# Patient Record
Sex: FEMALE | Race: BLACK OR AFRICAN AMERICAN | NOT HISPANIC OR LATINO | Employment: OTHER | URBAN - METROPOLITAN AREA
[De-identification: names, ages, dates, MRNs, and addresses within clinical notes are randomized per-mention and may not be internally consistent; named-entity substitution may affect disease eponyms.]

---

## 2022-05-25 ENCOUNTER — EVALUATION (OUTPATIENT)
Dept: PHYSICAL THERAPY | Facility: CLINIC | Age: 72
End: 2022-05-25
Payer: MEDICARE

## 2022-05-25 DIAGNOSIS — M25.611 SHOULDER JOINT STIFFNESS, RIGHT: Primary | ICD-10-CM

## 2022-05-25 DIAGNOSIS — M25.612 SHOULDER JOINT STIFFNESS, LEFT: ICD-10-CM

## 2022-05-25 PROCEDURE — 97110 THERAPEUTIC EXERCISES: CPT

## 2022-05-25 PROCEDURE — 97161 PT EVAL LOW COMPLEX 20 MIN: CPT

## 2022-05-25 NOTE — LETTER
May 25, 2022    Lisette Andujar, [address] [facility] [place]    Patient: Yumiko Weinberg   YOB: 1950   Date of Visit: 2022     Encounter Diagnosis     ICD-10-CM    1  Shoulder joint stiffness, right  M25 611    2  Shoulder joint stiffness, left  M25 612        Dear Dr Duglas Perez: Thank you for your recent referral of Yumiko Weinberg  Please review the attached evaluation summary from Xochitl's recent visit  Please verify that you agree with the plan of care by signing the attached order  If you have any questions or concerns, please do not hesitate to call  I sincerely appreciate the opportunity to share in the care of one of your patients and hope to have another opportunity to work with you in the near future  Sincerely,    Boom Aviles, PT      Referring Provider:      I certify that I have read the below Plan of Care and certify the need for these services furnished under this plan of treatment while under my care  Lisette Andujar MD  [address]  [facility]          PT Evaluation     Today's date: 2022  Patient name: Yumiko Weinberg  : 1950  MRN: [id number]  Referring provider: Katherine Smith MD  Dx:   Encounter Diagnosis     ICD-10-CM    1  Shoulder joint stiffness, right  M25 611    2  Shoulder joint stiffness, left  M25 612                   Assessment  Assessment details: Yumiko Weinberg is a 67 y o  female presenting to physical therapy for an initial evaluation with a MD referral of bilateral shoulder pain and stiffness The patient demonstrates increased pain, decreased shoulder ROM, decreased shoulder strength, and limited ability to complete overhead mobility, dressing and lifting pots/pans  These deficits have limited the patient's ability to complete ADLs, avocational responsibilities, and recreational activities   This patient would benefit from OP PT services to address their impairments and functional limitations to maximize functional mobility  The patient was provided a HEP and demonstrated/verbalized understanding  Impairments: abnormal or restricted ROM, activity intolerance, impaired physical strength, lacks appropriate home exercise program and pain with function    Symptom irritability: moderateUnderstanding of Dx/Px/POC: excellent   Prognosis: good    Goals  STG to be achieved in 4 weeks: The patient will report a decrease in bilateral shoulder "at worst" subjective pain rating score to at least 5/10 to allow for improved tolerance for functional activities  The patient will increase left shoulder flexion AROM to at least 115 degrees to allow for improved functional mobility  The patient will increase left shoulder flexion MMT score to at least 4/5 to allow for improved functional mobility  LTG to be achieved by DC: The patient will be independent in comprehensive HEP  The patient will improve left shoulder AROM to that of the right to allow for improved functional mobility  The patient will increase bilateral shoulder ER MMT score to [facility] to allow for improved functional mobility  The patient will be able to lift a moderately heavy grocery bag without difficulty  The patient will be able to lift her pots/pans onto her stove without difficulty  The patient will be able to put on her shirts and jackets without difficulty         Plan  Patient would benefit from: skilled physical therapy  Planned modality interventions: thermotherapy: hydrocollator packs, TENS and cryotherapy  Planned therapy interventions: manual therapy, joint mobilization, neuromuscular re-education, patient education, flexibility, strengthening, stretching, therapeutic activities, therapeutic exercise and home exercise program  Frequency: 2x week  Duration in weeks: 8  Plan of Care beginning date: 5/25/2022  Plan of Care expiration date: 7/20/2022  Treatment plan discussed with: patient        Subjective Evaluation    History of Present Illness  Mechanism of injury: George Elizabeth presents to therapy with a chief complaint of bilateral shoulder pain and stiffness ongoing for the past several months  Denies any incident that may have aggravated her shoulders, notes that just gradually over time the shoulders became more stiff and painful  Reports increased difficulty raising her arm to reach objects on high shelves in her kitchen and bathroom cabinets as well as in the closet of her bedroom  Also notes difficulty lifting grocery bags that may be heavy as well as lifting a pot from one part of the stove to the other or lifting a pot or pan from a cabinet onto the stove  Reports difficulty putting her shirt and jacket on/off as well as reaching behind her back for her bra strap  States that if she does force herself to do these activities, the pain comes afterwards  Denies history of neck pain or difficulties turning her neck  Notes tingling in both of her hands in the fingertips and states that this was ongoing prior to the start of her shoulder pain  Denies worsening of tingling with activities and feels that this is likely related to diabetes     Pain  Current pain ratin  At best pain ratin  At worst pain ratin  Location: bilateral shoulder pain L>R  Quality: dull ache  Relieving factors: medications and relaxation  Aggravating factors: lifting and overhead activity    Social Support  Lives with: spouse    Employment status: not working  Hand dominance: right          Objective     Active Range of Motion   Left Shoulder   Flexion: 100 degrees with pain  Adduction: 100 degrees with pain  External rotation BTH: C5 with pain  Internal rotation BTB: sacrum with pain    Right Shoulder   Flexion: 125 degrees with pain  Abduction: 120 degrees with pain  External rotation BTH: C7 with pain  Internal rotation BTB: L1 with pain    Passive Range of Motion   Left Shoulder   Flexion: 107 degrees with pain  Abduction: 104 degrees with pain  External rotation 45°: 48 degrees   Internal rotation 45°: 46 degrees with pain    Right Shoulder   Flexion: 127 degrees with pain  Abduction: 125 degrees with pain  External rotation 45°: 58 degrees with pain  Internal rotation 45°: 55 degrees with pain    Strength/Myotome Testing     Left Shoulder     Planes of Motion   Flexion: 4-   Abduction: 4-   External rotation at 0°: 3+   Internal rotation at 0°: 4-     Right Shoulder     Planes of Motion   Flexion: 4   Abduction: 4   External rotation at 0°: 4-   Internal rotation at 0°: 4              Precautions: DM, HTN, Hypothyroidism   Access Code: [email or web address]       Manuals 5/25                                                                Neuro Re-Ed             TB rows             TB pulldowns             Bent over rows             Bent over prone Y,T,Is                                                    Ther Ex             UBE fwd/retro             Pulleys             Finger Ladder             Wall slides flex, scapt HEP            TB ER             TB IR             Standing shoulder 3 ways              Standing shoulder IR stretch with strap             Pt education PT POC, HEP            Ther Activity                                       Gait Training                                       Modalities

## 2022-05-25 NOTE — PROGRESS NOTES
PT Evaluation     Today's date: 2022  Patient name: Waleska Contreras  : 1950  MRN: [id number]  Referring provider: Tim Gottron, MD  Dx:   Encounter Diagnosis     ICD-10-CM    1  Shoulder joint stiffness, right  M25 611    2  Shoulder joint stiffness, left  M25 612                   Assessment  Assessment details: Waleska Contreras is a 67 y o  female presenting to physical therapy for an initial evaluation with a MD referral of bilateral shoulder pain and stiffness The patient demonstrates increased pain, decreased shoulder ROM, decreased shoulder strength, and limited ability to complete overhead mobility, dressing and lifting pots/pans  These deficits have limited the patient's ability to complete ADLs, avocational responsibilities, and recreational activities  This patient would benefit from OP PT services to address their impairments and functional limitations to maximize functional mobility  The patient was provided a HEP and demonstrated/verbalized understanding  Impairments: abnormal or restricted ROM, activity intolerance, impaired physical strength, lacks appropriate home exercise program and pain with function    Symptom irritability: moderateUnderstanding of Dx/Px/POC: excellent   Prognosis: good    Goals  STG to be achieved in 4 weeks: The patient will report a decrease in bilateral shoulder "at worst" subjective pain rating score to at least 5/10 to allow for improved tolerance for functional activities  The patient will increase left shoulder flexion AROM to at least 115 degrees to allow for improved functional mobility  The patient will increase left shoulder flexion MMT score to at least 4/5 to allow for improved functional mobility  LTG to be achieved by DC: The patient will be independent in comprehensive HEP  The patient will improve left shoulder AROM to that of the right to allow for improved functional mobility     The patient will increase bilateral shoulder ER MMT score to [facility] to allow for improved functional mobility  The patient will be able to lift a moderately heavy grocery bag without difficulty  The patient will be able to lift her pots/pans onto her stove without difficulty  The patient will be able to put on her shirts and jackets without difficulty  Plan  Patient would benefit from: skilled physical therapy  Planned modality interventions: thermotherapy: hydrocollator packs, TENS and cryotherapy  Planned therapy interventions: manual therapy, joint mobilization, neuromuscular re-education, patient education, flexibility, strengthening, stretching, therapeutic activities, therapeutic exercise and home exercise program  Frequency: 2x week  Duration in weeks: 8  Plan of Care beginning date: 5/25/2022  Plan of Care expiration date: 7/20/2022  Treatment plan discussed with: patient        Subjective Evaluation    History of Present Illness  Mechanism of injury: Mendez Menchaca presents to therapy with a chief complaint of bilateral shoulder pain and stiffness ongoing for the past several months  Denies any incident that may have aggravated her shoulders, notes that just gradually over time the shoulders became more stiff and painful  Reports increased difficulty raising her arm to reach objects on high shelves in her kitchen and bathroom cabinets as well as in the closet of her bedroom  Also notes difficulty lifting grocery bags that may be heavy as well as lifting a pot from one part of the stove to the other or lifting a pot or pan from a cabinet onto the stove  Reports difficulty putting her shirt and jacket on/off as well as reaching behind her back for her bra strap  States that if she does force herself to do these activities, the pain comes afterwards  Denies history of neck pain or difficulties turning her neck  Notes tingling in both of her hands in the fingertips and states that this was ongoing prior to the start of her shoulder pain   Denies worsening of tingling with activities and feels that this is likely related to diabetes     Pain  Current pain ratin  At best pain ratin  At worst pain ratin  Location: bilateral shoulder pain L>R  Quality: dull ache  Relieving factors: medications and relaxation  Aggravating factors: lifting and overhead activity    Social Support  Lives with: spouse    Employment status: not working  Hand dominance: right          Objective     Active Range of Motion   Left Shoulder   Flexion: 100 degrees with pain  Adduction: 100 degrees with pain  External rotation BTH: C5 with pain  Internal rotation BTB: sacrum with pain    Right Shoulder   Flexion: 125 degrees with pain  Abduction: 120 degrees with pain  External rotation BTH: C7 with pain  Internal rotation BTB: L1 with pain    Passive Range of Motion   Left Shoulder   Flexion: 107 degrees with pain  Abduction: 104 degrees with pain  External rotation 45°: 48 degrees   Internal rotation 45°: 46 degrees with pain    Right Shoulder   Flexion: 127 degrees with pain  Abduction: 125 degrees with pain  External rotation 45°: 58 degrees with pain  Internal rotation 45°: 55 degrees with pain    Strength/Myotome Testing     Left Shoulder     Planes of Motion   Flexion: 4-   Abduction: 4-   External rotation at 0°: 3+   Internal rotation at 0°: 4-     Right Shoulder     Planes of Motion   Flexion: 4   Abduction: 4   External rotation at 0°: 4-   Internal rotation at 0°: 4              Precautions: DM, HTN, Hypothyroidism   Access Code: [email or web address]       Manuals                                                                 Neuro Re-Ed             TB rows             TB pulldowns             Bent over rows             Bent over prone Y,T,Is                                                    Ther Ex             UBE fwd/retro             Pulleys             Finger Ladder             Wall slides flex, scapt HEP            TB ER             TB IR             Standing shoulder 3 ways              Standing shoulder IR stretch with strap             Pt education PT POC, HEP            Ther Activity                                       Gait Training                                       Modalities

## 2022-06-01 ENCOUNTER — OFFICE VISIT (OUTPATIENT)
Dept: PHYSICAL THERAPY | Facility: CLINIC | Age: 72
End: 2022-06-01
Payer: MEDICARE

## 2022-06-01 DIAGNOSIS — M25.612 SHOULDER JOINT STIFFNESS, LEFT: Primary | ICD-10-CM

## 2022-06-01 DIAGNOSIS — M25.611 SHOULDER JOINT STIFFNESS, RIGHT: ICD-10-CM

## 2022-06-01 PROCEDURE — 97110 THERAPEUTIC EXERCISES: CPT

## 2022-06-03 ENCOUNTER — OFFICE VISIT (OUTPATIENT)
Dept: PHYSICAL THERAPY | Facility: CLINIC | Age: 72
End: 2022-06-03
Payer: MEDICARE

## 2022-06-03 DIAGNOSIS — M25.611 SHOULDER JOINT STIFFNESS, RIGHT: ICD-10-CM

## 2022-06-03 DIAGNOSIS — M25.612 SHOULDER JOINT STIFFNESS, LEFT: Primary | ICD-10-CM

## 2022-06-03 PROCEDURE — 97110 THERAPEUTIC EXERCISES: CPT

## 2022-06-03 PROCEDURE — 97112 NEUROMUSCULAR REEDUCATION: CPT

## 2022-06-03 NOTE — PROGRESS NOTES
Daily Note     Today's date: 6/3/2022  Patient name: Se Norton  : 1950  MRN: [id number]  Referring provider: Anna Lew MD  Dx:   Encounter Diagnosis     ICD-10-CM    1  Shoulder joint stiffness, left  M25 612    2  Shoulder joint stiffness, right  M25 611                   Subjective: Patient reports that she felt stiff and achy after last session  Notes that they feel "pretty good" today  Objective: See treatment diary below      Assessment: Tolerated treatment well  Added finger ladder this session to facilitate improved shoulder flexion ROM bilaterally  Also progressed scapular stabilization exercises with addition of bent over rows  Patient demonstrated fatigue post treatment, exhibited good technique with therapeutic exercises and would benefit from continued PT      Plan: Continue per plan of care        Precautions: DM, HTN, Hypothyroidism   Access Code: [facility]       Manuals 5/25 6/1 6/3                                                              Neuro Re-Ed             TB rows  RTB 2x10  RTB 2x10           TB pulldowns  RTB 2x10  RTB 2x10          Bent over rows   3# 2x10 B/L          Bent over prone Y,T,Is                                                    Ther Ex             UBE fwd/retro             Pulleys  3' flex, scapt 3' flex, scapt          Finger Ladder   x10 B/L          Wall slides flex, scapt HEP 5"x10 B/L           TB ER  RTB 2x10 B/L RTB 2x10 B/L          TB IR  RTB 2x10   B/L RTB 2x10 B/L          Standing shoulder 3 ways              Standing shoulder IR stretch with strap             Pt education PT POC, HEP            Ther Activity                                       Gait Training                                       Modalities

## 2022-06-08 ENCOUNTER — OFFICE VISIT (OUTPATIENT)
Dept: PHYSICAL THERAPY | Facility: CLINIC | Age: 72
End: 2022-06-08
Payer: MEDICARE

## 2022-06-08 DIAGNOSIS — M25.612 SHOULDER JOINT STIFFNESS, LEFT: Primary | ICD-10-CM

## 2022-06-08 DIAGNOSIS — M25.611 SHOULDER JOINT STIFFNESS, RIGHT: ICD-10-CM

## 2022-06-08 PROCEDURE — 97112 NEUROMUSCULAR REEDUCATION: CPT

## 2022-06-08 PROCEDURE — 97110 THERAPEUTIC EXERCISES: CPT

## 2022-06-08 NOTE — PROGRESS NOTES
Daily Note     Today's date: 2022  Patient name: Sasha Calderón  : 1950  MRN: [id number]  Referring provider: Horace aRymond MD  Dx:   Encounter Diagnosis     ICD-10-CM    1  Shoulder joint stiffness, left  M25 612    2  Shoulder joint stiffness, right  M25 611                   Subjective: Patient reports that her shoulder felt very sore last night and thinks it may be due to over using it with lifting groceries  Objective: See treatment diary below      Assessment: Tolerated treatment well  Progressed patient with increased reps as outlined below  Unable to increase reps for shoulder ER secondary to patient noting significant challenge with current reps  Added shoulder IR stretch with strap to facilitate improved shoulder IR ROM  Patient demonstrated fatigue post treatment, exhibited good technique with therapeutic exercises and would benefit from continued PT      Plan: Progress treatment as tolerated         Precautions: DM, HTN, Hypothyroidism   Access Code: [facility]       Manuals 5/25 6/1 6/3 6/8                                                             Neuro Re-Ed             TB rows  RTB 2x10  RTB 2x10  RTB 3x10         TB pulldowns  RTB 2x10  RTB 2x10 RTB 3x10          Bent over rows   3# 2x10 B/L 3# 2x10 B/L         Bent over prone Y,T,Is                                                    Ther Ex             UBE fwd/retro             Pulleys  3' flex, scapt 3' flex, scapt 3' flex, scapt          Finger Ladder   x10 B/L          Wall slides flex, scapt HEP 5"x10 B/L           TB ER  RTB 2x10 B/L RTB 2x10 B/L RTB 2x10 B/L         TB IR  RTB 2x10   B/L RTB 2x10 B/L RTB   3x10 B/L         Standing shoulder 3 ways              Standing shoulder IR stretch with strap    10"x10 B/L         Pt education PT POC, HEP            Ther Activity                                       Gait Training                                       Modalities Patient 1 on 1 on 6/8/22 for 23 minutes

## 2022-06-10 ENCOUNTER — OFFICE VISIT (OUTPATIENT)
Dept: PHYSICAL THERAPY | Facility: CLINIC | Age: 72
End: 2022-06-10
Payer: MEDICARE

## 2022-06-10 DIAGNOSIS — M25.611 SHOULDER JOINT STIFFNESS, RIGHT: ICD-10-CM

## 2022-06-10 DIAGNOSIS — M25.612 SHOULDER JOINT STIFFNESS, LEFT: Primary | ICD-10-CM

## 2022-06-10 PROCEDURE — 97112 NEUROMUSCULAR REEDUCATION: CPT

## 2022-06-10 PROCEDURE — 97110 THERAPEUTIC EXERCISES: CPT

## 2022-06-10 NOTE — PROGRESS NOTES
Daily Note     Today's date: 6/10/2022  Patient name: Megan Fairchild  : 1950  MRN: [id number]  Referring provider: David Elias MD  Dx:   Encounter Diagnosis     ICD-10-CM    1  Shoulder joint stiffness, left  M25 612    2  Shoulder joint stiffness, right  M25 611                   Subjective: States that she feels achy and stiff today  Notes that the day after therapy they seem good and then they tighten back up  Objective: See treatment diary below      Assessment: Tolerated treatment well  Added standing shoulder 3 ways with increase fatigue following  Patient reported joint crepitus during this exercise, however was able to complete assigned reps  Patient demonstrated fatigue post treatment, exhibited good technique with therapeutic exercises and would benefit from continued PT      Plan: Continue per plan of care        Precautions: DM, HTN, Hypothyroidism   Access Code: [facility]       Manuals 5/25 6/1 6/3 6/8 6/10                                                            Neuro Re-Ed             TB rows  RTB 2x10  RTB 2x10  RTB 3x10 RTB 3x10        TB pulldowns  RTB 2x10  RTB 2x10 RTB 3x10  RTB 3x10         Bent over rows   3# 2x10 B/L 3# 2x10 B/L 3# 2x10 B/L         Bent over prone Y,T,Is                                                    Ther Ex             UBE fwd/retro             Pulleys  3' flex, scapt 3' flex, scapt 3' flex, scapt  3' flex, scapt         Finger Ladder   x10 B/L  x10 B/L        Wall slides flex, scapt HEP 5"x10 B/L   5"x10 B/L         TB ER  RTB 2x10 B/L RTB 2x10 B/L RTB 2x10 B/L         TB IR  RTB 2x10   B/L RTB 2x10 B/L RTB   3x10 B/L         Standing shoulder 3 ways      1# x10 ea dir         Standing shoulder IR stretch with strap    10"x10 B/L         Pt education PT POC, HEP            Ther Activity                                       Gait Training                                       Modalities

## 2022-06-15 ENCOUNTER — OFFICE VISIT (OUTPATIENT)
Dept: PHYSICAL THERAPY | Facility: CLINIC | Age: 72
End: 2022-06-15
Payer: MEDICARE

## 2022-06-15 DIAGNOSIS — M25.611 SHOULDER JOINT STIFFNESS, RIGHT: ICD-10-CM

## 2022-06-15 DIAGNOSIS — M25.612 SHOULDER JOINT STIFFNESS, LEFT: Primary | ICD-10-CM

## 2022-06-15 PROCEDURE — 97110 THERAPEUTIC EXERCISES: CPT

## 2022-06-15 PROCEDURE — 97112 NEUROMUSCULAR REEDUCATION: CPT

## 2022-06-15 NOTE — PROGRESS NOTES
Daily Note     Today's date: 6/15/2022   Patient name: Aysha Concepcion  : 1950  MRN: [id number]  Referring provider: Ashley Blanchard MD  Dx:   Encounter Diagnosis     ICD-10-CM    1  Shoulder joint stiffness, left  M25 612    2  Shoulder joint stiffness, right  M25 611                   Subjective: Patient reports that her shoulders are feeling pretty good today  Notes that she has been completing her exercises at home  Objective: See treatment diary below      Assessment: Tolerated treatment well  UT compensations noted with TB pulldowns; required VCs to correct form  Added B/L shoulder ER this session to facilitate improve scapular stabilization  Patient demonstrated fatigue post treatment, exhibited good technique with therapeutic exercises and would benefit from continued PT      Plan: Continue per plan of care        Precautions: DM, HTN, Hypothyroidism   Access Code: [facility]       Manuals 5/25 6/1 6/3 6/8 6/10 6/15                                                           Neuro Re-Ed             TB rows  RTB 2x10  RTB 2x10  RTB 3x10 RTB 3x10 RTB 3x10        TB pulldowns  RTB 2x10  RTB 2x10 RTB 3x10  RTB 3x10  RTB 3x10        Bent over rows   3# 2x10 B/L 3# 2x10 B/L 3# 2x10 B/L  3# 2x10 B/L       Bent over prone Y,T,Is             B/L Shoulder ER      RTB x10                                  Ther Ex             UBE fwd/retro             Pulleys  3' flex, scapt 3' flex, scapt 3' flex, scapt  3' flex, scapt  3' flex, scapt       Finger Ladder   x10 B/L  x10 B/L        Wall slides flex, scapt HEP 5"x10 B/L   5"x10 B/L         TB ER  RTB 2x10 B/L RTB 2x10 B/L RTB 2x10 B/L  RTB 2x10 B/L       TB IR  RTB 2x10   B/L RTB 2x10 B/L RTB   3x10 B/L  RTB 3x10 B/L       Standing shoulder 3 ways      1# x10 ea dir  1# x10 ea dir        Standing shoulder IR stretch with strap    10"x10 B/L         Pt education PT POC, HEP            Ther Activity                                       Gait Training Modalities                                           Patient 1 on 1 on 6/15/22 for 38 minutes

## 2022-06-17 ENCOUNTER — OFFICE VISIT (OUTPATIENT)
Dept: PHYSICAL THERAPY | Facility: CLINIC | Age: 72
End: 2022-06-17
Payer: MEDICARE

## 2022-06-17 DIAGNOSIS — M25.612 SHOULDER JOINT STIFFNESS, LEFT: Primary | ICD-10-CM

## 2022-06-17 DIAGNOSIS — M25.611 SHOULDER JOINT STIFFNESS, RIGHT: ICD-10-CM

## 2022-06-17 PROCEDURE — 97112 NEUROMUSCULAR REEDUCATION: CPT

## 2022-06-17 PROCEDURE — 97110 THERAPEUTIC EXERCISES: CPT

## 2022-06-17 NOTE — PROGRESS NOTES
Daily Note     Today's date: 2022  Patient name: Irma Sebastian  : 1950  MRN: [id number]  Referring provider: Marcell Contreras MD  Dx:   Encounter Diagnosis     ICD-10-CM    1  Shoulder joint stiffness, left  M25 612    2  Shoulder joint stiffness, right  M25 611                   Subjective: Pt noted no pain but just stiffness  Objective: See treatment diary below      Assessment: Continued with treatment session, overall patient able to complete all exercises with no increase in p!   Progressed to GTB with pull downs and rows  Pt noted that her R shoulder is worse than the L  Tolerated treatment fair  Patient exhibited good technique with therapeutic exercises and would benefit from continued PT  Pt noted having some soreness s/p treatment  Pt noted compliant with her HEP on a daily basis  Plan: Continue per plan of care  Precautions: DM, HTN, Hypothyroidism   Access Code: [facility]       Manuals 5/25 6/1 6/3 6/8 6/10 6/15 6/17                                                          Neuro Re-Ed             TB rows  RTB 2x10  RTB 2x10  RTB 3x10 RTB 3x10 RTB 3x10  GTB 3x 10       TB pulldowns  RTB 2x10  RTB 2x10 RTB 3x10  RTB 3x10  RTB 3x10  GTB 3x 10       Bent over rows   3# 2x10 B/L 3# 2x10 B/L 3# 2x10 B/L  3# 2x10 B/L 3# 2x 10       Bent over prone Y,T,Is             B/L Shoulder ER      RTB x10                                  Ther Ex             UBE fwd/retro             Pulleys  3' flex, scapt 3' flex, scapt 3' flex, scapt  3' flex, scapt  3' flex, scapt 3 min flexion and scaption       Finger Ladder   x10 B/L  x10 B/L        Wall slides flex, scapt HEP 5"x10 B/L   5"x10 B/L         TB ER  RTB 2x10 B/L RTB 2x10 B/L RTB 2x10 B/L  RTB 2x10 B/L RTB 2x 10       TB IR  RTB 2x10   B/L RTB 2x10 B/L RTB   3x10 B/L  RTB 3x10 B/L RTB 2x 10 ea   B/l       Standing shoulder 3 ways      1# x10 ea dir  1# x10 ea dir  1# 10x ea direction       Standing shoulder IR stretch with strap 10"x10 B/L         Pt education PT POC, HEP            Ther Activity                                       Gait Training                                       Modalities

## 2022-06-22 ENCOUNTER — OFFICE VISIT (OUTPATIENT)
Dept: PHYSICAL THERAPY | Facility: CLINIC | Age: 72
End: 2022-06-22
Payer: MEDICARE

## 2022-06-22 DIAGNOSIS — M25.612 SHOULDER JOINT STIFFNESS, LEFT: Primary | ICD-10-CM

## 2022-06-22 DIAGNOSIS — M25.611 SHOULDER JOINT STIFFNESS, RIGHT: ICD-10-CM

## 2022-06-22 PROCEDURE — 97110 THERAPEUTIC EXERCISES: CPT

## 2022-06-22 PROCEDURE — 97112 NEUROMUSCULAR REEDUCATION: CPT

## 2022-06-22 NOTE — PROGRESS NOTES
Daily Note     Today's date: 2022  Patient name: Hyun Tillman  : 1950  MRN: [id number]  Referring provider: Soren Knapp MD  Dx:   Encounter Diagnosis     ICD-10-CM    1  Shoulder joint stiffness, left  M25 612    2  Shoulder joint stiffness, right  M25 611                   Subjective: Pt noted that she feels like she is making improvements  Pt noted that she feels stiffer in the morning than she does in the evening  Objective: See treatment diary below      Assessment: Continued with treatment, Tolerated treatment fairly well   Pt noted challenge with Shoulder AROM to 90 degrees in flexion and scaption  Patient exhibited good technique with therapeutic exercises and would benefit from continued PT  Provided patient a GTB for at home continuation to progress  Plan: Continue per plan of care  RE - NV, please provide patient with updated HEP at this time  Precautions: DM, HTN, Hypothyroidism   Access Code: [facility]       Manuals  6/3 6/8 6/10 6/15 6/17 6/22                                                         Neuro Re-Ed             TB rows  RTB 2x10  RTB 2x10  RTB 3x10 RTB 3x10 RTB 3x10  GTB 3x 10  GTb 3x 10      TB pulldowns  RTB 2x10  RTB 2x10 RTB 3x10  RTB 3x10  RTB 3x10  GTB 3x 10  GTB 3x 10      Bent over rows   3# 2x10 B/L 3# 2x10 B/L 3# 2x10 B/L  3# 2x10 B/L 3# 2x 10       Bent over prone Y,T,Is             B/L Shoulder ER      RTB x10                                  Ther Ex             UBE fwd/retro             Pulleys  3' flex, scapt 3' flex, scapt 3' flex, scapt  3' flex, scapt  3' flex, scapt 3 min flexion and scaption  3 min flexion and scaption      Finger Ladder   x10 B/L  x10 B/L        Wall slides flex, scapt HEP 5"x10 B/L   5"x10 B/L         TB ER  RTB 2x10 B/L RTB 2x10 B/L RTB 2x10 B/L  RTB 2x10 B/L RTB 2x 10  RTB 2x 10      TB IR  RTB 2x10   B/L RTB 2x10 B/L RTB   3x10 B/L  RTB 3x10 B/L RTB 2x 10 ea  B/l  RTB 2x 10 ea        Standing shoulder 3 ways      1# x10 ea dir  1# x10 ea dir  1# 10x ea direction       Standing shoulder IR stretch with strap    10"x10 B/L         Pt education PT POC, HEP            Ther Activity                                       Gait Training                                       Modalities

## 2022-06-24 ENCOUNTER — EVALUATION (OUTPATIENT)
Dept: PHYSICAL THERAPY | Facility: CLINIC | Age: 72
End: 2022-06-24
Payer: MEDICARE

## 2022-06-24 DIAGNOSIS — M25.612 SHOULDER JOINT STIFFNESS, LEFT: Primary | ICD-10-CM

## 2022-06-24 DIAGNOSIS — M25.611 SHOULDER JOINT STIFFNESS, RIGHT: ICD-10-CM

## 2022-06-24 PROCEDURE — 97110 THERAPEUTIC EXERCISES: CPT

## 2022-06-24 PROCEDURE — 97112 NEUROMUSCULAR REEDUCATION: CPT

## 2022-06-24 NOTE — PROGRESS NOTES
PT Discharge    Today's date: 2022  Patient name: Rene   : 1950  MRN: [id number]  Referring provider: Thalia Brody MD  Dx:   Encounter Diagnosis     ICD-10-CM    1  Shoulder joint stiffness, left  M25 612    2  Shoulder joint stiffness, right  M25 611                   Assessment  Assessment details: Rene  is a 67 y o  female presenting to physical therapy for a reevaluation with a MD referral of bilateral shoulder pain and stiffness  Since her initial evaluation, she reports 50% improvement in her shoulders  The patient has demonstrated improved shoulder active and passive ROM, shoulder strength, and ability to complete functional activities such as lifting and reaching into overhead kitchen cabinets  She has met her goals, is independent in completing her HEP, and will be DC to a comprehensive HEP at this time  Understanding of Dx/Px/POC: excellent   Prognosis: good    Goals  STG to be achieved in 4 weeks: The patient will report a decrease in bilateral shoulder "at worst" subjective pain rating score to at least 5/10 to allow for improved tolerance for functional activities  MET  The patient will increase left shoulder flexion AROM to at least 115 degrees to allow for improved functional mobility  MET  The patient will increase left shoulder flexion MMT score to at least 4/5 to allow for improved functional mobility  MET    LTG to be achieved by DC: The patient will be independent in comprehensive HEP  MET  The patient will improve left shoulder AROM to that of the right to allow for improved functional mobility  NOT MET  The patient will increase bilateral shoulder ER MMT score to [facility] to allow for improved functional mobility  MET  The patient will be able to lift a moderately heavy grocery bag without difficulty  MET  The patient will be able to lift her pots/pans onto her stove without difficulty   MET  The patient will be able to put on her shirts and jackets without difficulty  MET      Plan  Plan details: DC to comprehensive HEP  Plan of Care beginning date: 2022  Plan of Care expiration date: 2022  Treatment plan discussed with: patient        Subjective Evaluation    History of Present Illness  Mechanism of injury: The patient reports 50% improvement since beginning PT services  She notes improvement in her shoulder strength since initial evaluation  She notes that she can reach into the higher shelves of her kitchen and bathroom closets better  She reports that she can also better manage lifting heavier grocery bags and moving her pots/pans from one stove burner to the next or from the cabinet to the stove  She reports that they have continued stiffness in her shoulders in the morning mostly as well as difficulty reaching her bra strap behind her back     Pain  Current pain rating: 3  At best pain rating: 3  At worst pain ratin  Location: bilateral shoulder pain L>R  Quality: dull ache  Relieving factors: medications and relaxation  Aggravating factors: lifting and overhead activity  Progression: improved    Social Support  Lives with: spouse    Employment status: not working  Hand dominance: right    Patient Goals  Patient goals for therapy: increased motion and increased strength          Objective     Active Range of Motion   Left Shoulder   Flexion: 120 degrees with pain  Adduction: 120 degrees with pain  External rotation BTH: T1 with pain  Internal rotation BTB: L4 with pain    Right Shoulder   Flexion: 135 degrees with pain  Abduction: 135 degrees with pain  External rotation BTH: T1 with pain  Internal rotation BTB: L1 with pain    Passive Range of Motion   Left Shoulder   Flexion: 135 degrees with pain  Abduction: 135 degrees with pain  External rotation 45°: 55 degrees   Internal rotation 45°: 46 degrees with pain    Right Shoulder   Flexion: 135 degrees with pain  Abduction: 138 degrees with pain  External rotation 45°: 58 degrees with pain  Internal rotation 45°: 55 degrees with pain    Strength/Myotome Testing     Left Shoulder     Planes of Motion   Flexion: 4   Abduction: 4   External rotation at 0°: 4   Internal rotation at 0°: 4     Right Shoulder     Planes of Motion   Flexion: 4+   Abduction: 4+   External rotation at 0°: 4   Internal rotation at 0°: 4+              Precautions: DM, HTN, Hypothyroidism   Access Code: [facility]     Manuals 5/25 6/1 6/3 6/8 6/10 6/15 6/17 6/22 6/24                                                        Neuro Re-Ed             TB rows  RTB 2x10  RTB 2x10  RTB 3x10 RTB 3x10 RTB 3x10  GTB 3x 10  GTb 3x 10      TB pulldowns  RTB 2x10  RTB 2x10 RTB 3x10  RTB 3x10  RTB 3x10  GTB 3x 10  GTB 3x 10      Bent over rows   3# 2x10 B/L 3# 2x10 B/L 3# 2x10 B/L  3# 2x10 B/L 3# 2x 10   3# 2x10 B/L    Bent over prone Y,T,Is             B/L Shoulder ER      RTB x10                                  Ther Ex             UBE fwd/retro         3'/3'    Pulleys  3' flex, scapt 3' flex, scapt 3' flex, scapt  3' flex, scapt  3' flex, scapt 3 min flexion and scaption  3 min flexion and scaption  3 min flexion and scaption     Finger Ladder   x10 B/L  x10 B/L        Wall slides flex, scapt HEP 5"x10 B/L   5"x10 B/L         TB ER  RTB 2x10 B/L RTB 2x10 B/L RTB 2x10 B/L  RTB 2x10 B/L RTB 2x 10  RTB 2x 10      TB IR  RTB 2x10   B/L RTB 2x10 B/L RTB   3x10 B/L  RTB 3x10 B/L RTB 2x 10 ea  B/l  RTB 2x 10 ea  Standing shoulder 3 ways      1# x10 ea dir  1# x10 ea dir  1# 10x ea direction   1# 10x ea direction    Standing shoulder IR stretch with strap    10"x10 B/L         Pt education PT POC, HEP        reassesment, HEP review    Ther Activity                                       Gait Training                                       Modalities                                           Patient 1 on 1 on 6/24/22 for 38 minutes

## 2024-01-11 NOTE — PROGRESS NOTES
PT Evaluation     Today's date: 2024  Patient name: Xochitl Madrigal  : 1950  MRN: [id number]  Referring provider: Siegler, Eugenia L, MD  Dx:   Encounter Diagnosis     ICD-10-CM    1. Pain in both knees, unspecified chronicity  M25.561     M25.562       2. Impaired gait and mobility  R26.89                      Assessment  Assessment details: The patient is a 73 y/o female who presents to PT with diagnosis of B knee pain.  She has complaints of B knee pain though R knee > L knee.  Pain is typically along her medial knee.  She denies any N&T into BLE but does note swelling in R knee.  She demonstrates deficits with decreased ROM and strength, decreased flexibility, gait dysfunction, decreased balance and proprioception, difficulty with stair negotiation and pain with completing her ADLs and tasks at home.  She is I with all tasks but has pain when in Wbing position or with prolonged time on her feet.  She also notes stiffness in her knees and has a hard time transitioning from seated to standing or when initially getting out of bed in the morning.  TTP is noted along B medial joint line though R > L knee.  She ambulates without AD but with slow pratima and she lacks TKE on R with heel strike.  She has difficulty with stair negotiation and has been doing up and down the steps with non-reciprocal gait pattern.  She likes to walk on the treadmill but has been unable to do this because of her knee pain.  Secondary to pain and above deficits she is limited with her overall mobility and function.  The patient would benefit from continued PT to address deficits and improve function.  Tx to include ROM, stretching, strengthening, modalities, HEP, pt education, postural ed, lifting/body mechanics, neuro re-ed, balance/proprioception Te, MT and equipment.      Impairments: abnormal gait, abnormal or restricted ROM, activity intolerance, impaired balance, impaired physical strength, lacks appropriate home  "exercise program, pain with function and weight-bearing intolerance  Other impairment: decreased flexibility  Functional limitations: difficulty with stair negotiationUnderstanding of Dx/Px/POC: good   Prognosis: good    Goals  STGs:  1.  Initiate and complete HEP with verbal cues.  2.  Decrease R knee pain by > 25% in 4 weeks.  3.  Improve R knee ROM by > 5-10 degrees in 4 weeks.  4.  Improve B LE strength by 1/2-1 grade in 4 weeks.  LTGs:  1.  Patient to be I with HEP in 12 weeks.  2. Improve R knee ROM to 0-120 degrees in 12 weeks to improve function.    3. Improve B LE strength to 4+ to 5/5 t/o in 12 weeks to improve function.  4. Decrease B knee pain to < or = to 3-4/10 with activity in 12 weeks to improve function.  5.  Patient to ambulate with normalized gait pattern in 12 weeks.  6.  Stair negotiation is improved to PLOF in 12 weeks.  7.  Recreational performance is improved to PLOF in 12 weeks.  8.  ADL performance is improved to PLOF in 12 weeks.  9.  Improve 5x STS to < 14\" without use of UE in 12 weeks.         Plan  Plan details: Modalities and therapy interventions prn.    Patient would benefit from: skilled physical therapy  Planned modality interventions: cryotherapy and thermotherapy: hydrocollator packs  Planned therapy interventions: joint mobilization, balance, balance/weight bearing training, manual therapy, neuromuscular re-education, patient education, postural training, self care, strengthening, stretching, therapeutic exercise, therapeutic activities, flexibility, functional ROM exercises, gait training and home exercise program  Frequency: 2x week  Duration in weeks: 12  Plan of Care beginning date: 1/18/2024  Plan of Care expiration date: 4/11/2024  Treatment plan discussed with: patient      Subjective Evaluation    History of Present Illness  Mechanism of injury: The patient states that her pain started in the R knee first about three months ago with no known cause.  She notes that she " "was driving and spilled hot soup on her lap, she had told the doctor this but per patient the doctor does not feel her pain is coming from this incident.  Then about one month later she developed L knee pain.   She had been to see the doctor on 12/3/23.  No imaging was completed.  She referred to OPPT for B knee pain and she now presents for her evaluation.  She will be going back to see the doctor in about two months for her next appointment (typically sees the doctor every three months for her diabetes).      She notes that her pain has been staying about the same since she saw the doctor last month.  She notes that her R knee hurts > L knee.  Most pain is along her medial knee but at times with intermittent pain around her kneecap and down towards her shin.  She denies any N&T into B knees, denies any swelling in her L knee, notes swelling in her R knee (mostly along inner knee).     She also has complaints of stiffness in her knees, especially first thing in the morning or if sitting to long and goes to stand up.    She does note intermittent sensation of knees feeling like they want to buckle on her.  She reports that she likes to walk on the treadmill for exercise to help with her diabetes but she has been unable to do this because of her knee pain.      Patient Goals  Patient goals for therapy: decreased pain, increased motion, increased strength and return to sport/leisure activities  Patient goal: \"To help get rid of the pain in my knees, to be able to walk like I used too.\"  Pain  At best pain ratin  At worst pain ratin  Location: R Knee > L Knee, mostly along her medial knee  Quality: sharp, dull ache, discomfort and tight  Alleviating factors: Icy Hot.  Aggravating factors: walking and standing (going from seated to standing position, WBing activities)    Social Support  Steps to enter house: no  Stairs in house: yes   Lives in: multiple-level home  Lives with: spouse    Employment status: not " "working      Objective     Tenderness   Left Knee   Tenderness in the medial joint line. No tenderness in the lateral joint line.     Right Knee   Tenderness in the medial joint line. No tenderness in the lateral joint line.     Neurological Testing     Sensation     Knee   Left Knee   Intact: Light touch    Right Knee   Intact: light touch     Active Range of Motion   Left Knee   Flexion: 120 degrees   Extension: 0 degrees     Right Knee   Flexion: 106 degrees with pain  Extension: -3 degrees with pain    Additional Active Range of Motion Details  L SLR: 50  R SLR: 30/45    Passive Range of Motion     Right Knee   Flexion: 110 degrees with pain  Extension: -1 degrees with pain    Mobility   Patellar Mobility:   Left Knee   WFL: medial and lateral.     Right Knee   WFL: medial and lateral    Strength/Myotome Testing     Left Hip   Planes of Motion   Flexion: 4+  Adduction: WFL    Right Hip   Planes of Motion   Flexion: 4  Adduction: WFL    Left Knee   Flexion: 4+  Extension: 4+  Quadriceps contraction: good    Right Knee   Flexion: 4-  Extension: 4  Quadriceps contraction: fair    Swelling     Left Knee Girth Measurement (cm)   Joint line: 42 cm    Right Knee Girth Measurement (cm)   Joint line: 43 cm    Ambulation   Weight-Bearing Status   Assistive device used: none    Ambulation: Level Surfaces   Ambulation without assistive device: independent    Ambulation: Stairs   Ascend stairs: independent  Pattern: non-reciprocal  Descend stairs: independent  Pattern: non-reciprocal    Observational Gait   Decreased walking speed.     Additional Observational Gait Details  Lacks TKE on R with heel strike.    Neuro Exam:     Functional outcomes   5x sit to stand: 22\" (seconds)                  POC expires Unit limit Auth Expiration date PT/OT/ST + Visit Limit?   4/11/24 NA NA BOMN                           Visit/Unit Tracking  AUTH Status:  Date 1/18              N/A - BOMN Used 1               Remaining              " "        Precautions: DM, HTN, Hypothyroidism       Manuals 1/18       B LE - Hams, Calf        R Knee                        Neuro Re-Ed         BOSU Lunges        SLS        Tandem Stance         Sidestepping        TKE w/TBand                        Ther Ex        NuStep L2 7'       HR/TR HR - HEP       SLR x 3 Abd/Ext - HEP       LAQ Madhu 3\"x10       Marches Seated Madhu 10x       QSets        SAQ        SLR        Heel Slides        Bridges        S/L Hip Abd        Clamshells                Ther Activity        Stepups F/L        Stepdowns        STS        Gait Training                        Modalities        HP/CP prn                          Access Code: [email or web address]  URL: [email or web address]  Date: 01/18/2024  Prepared by: Zofia    Exercises  - Seated Long Arc Quad  - 1 x daily - 7 x weekly - 1 sets - 10 reps - 3\" hold  - Seated March  - 1 x daily - 7 x weekly - 1 sets - 10 reps  - Standing Heel Raise with Support  - 1 x daily - 7 x weekly - 1 sets - 10 reps  - Standing Hip Abduction  - 1 x daily - 7 x weekly - 1 sets - 10 reps  - Standing Hip Extension with Chair  - 1 x daily - 7 x weekly - 1 sets - 10 reps  "

## 2024-01-18 ENCOUNTER — EVALUATION (OUTPATIENT)
Dept: PHYSICAL THERAPY | Facility: CLINIC | Age: 74
End: 2024-01-18
Payer: MEDICARE

## 2024-01-18 DIAGNOSIS — R26.89 IMPAIRED GAIT AND MOBILITY: ICD-10-CM

## 2024-01-18 DIAGNOSIS — M25.561 PAIN IN BOTH KNEES, UNSPECIFIED CHRONICITY: Primary | ICD-10-CM

## 2024-01-18 DIAGNOSIS — M25.562 PAIN IN BOTH KNEES, UNSPECIFIED CHRONICITY: Primary | ICD-10-CM

## 2024-01-18 PROCEDURE — 97161 PT EVAL LOW COMPLEX 20 MIN: CPT | Performed by: PHYSICAL THERAPIST

## 2024-01-18 PROCEDURE — 97110 THERAPEUTIC EXERCISES: CPT | Performed by: PHYSICAL THERAPIST

## 2024-01-18 PROCEDURE — 97535 SELF CARE MNGMENT TRAINING: CPT | Performed by: PHYSICAL THERAPIST

## 2024-01-18 NOTE — LETTER
2024    Eugenia L Siegler, MD  [address] [address]    Patient: Xochitl Madrigal   YOB: 1950   Date of Visit: 2024     Encounter Diagnosis     ICD-10-CM    1. Pain in both knees, unspecified chronicity  M25.561     M25.562       2. Impaired gait and mobility  R26.89           Dear Dr. Siegler:    Thank you for your recent referral of Xochitl Madrigal. Please review the attached evaluation summary from Xochitl's recent visit.     Please verify that you agree with the plan of care by signing the attached order.     If you have any questions or concerns, please do not hesitate to call.     I sincerely appreciate the opportunity to share in the care of one of your patients and hope to have another opportunity to work with you in the near future.       Sincerely,    Zofia Turk, PT      Referring Provider:      I certify that I have read the below Plan of Care and certify the need for these services furnished under this plan of treatment while under my care.                    Eugenia L Siegler, MD  [address] [address]  Via Fax: [phone]          PT Evaluation     Today's date: 2024  Patient name: Xochitl Madrigal  : 1950  MRN: [id number]  Referring provider: Siegler, Eugenia L, MD  Dx:   Encounter Diagnosis     ICD-10-CM    1. Pain in both knees, unspecified chronicity  M25.561     M25.562       2. Impaired gait and mobility  R26.89                      Assessment  Assessment details: The patient is a 73 y/o female who presents to PT with diagnosis of B knee pain.  She has complaints of B knee pain though R knee > L knee.  Pain is typically along her medial knee.  She denies any N&T into BLE but does note swelling in R knee.  She demonstrates deficits with decreased ROM and strength, decreased flexibility, gait dysfunction, decreased balance and proprioception, difficulty with stair negotiation and pain with completing her  ADLs and tasks at home.  She is I with all tasks but has pain when in Wbing position or with prolonged time on her feet.  She also notes stiffness in her knees and has a hard time transitioning from seated to standing or when initially getting out of bed in the morning.  TTP is noted along B medial joint line though R > L knee.  She ambulates without AD but with slow pratima and she lacks TKE on R with heel strike.  She has difficulty with stair negotiation and has been doing up and down the steps with non-reciprocal gait pattern.  She likes to walk on the treadmill but has been unable to do this because of her knee pain.  Secondary to pain and above deficits she is limited with her overall mobility and function.  The patient would benefit from continued PT to address deficits and improve function.  Tx to include ROM, stretching, strengthening, modalities, HEP, pt education, postural ed, lifting/body mechanics, neuro re-ed, balance/proprioception Te, MT and equipment.      Impairments: abnormal gait, abnormal or restricted ROM, activity intolerance, impaired balance, impaired physical strength, lacks appropriate home exercise program, pain with function and weight-bearing intolerance  Other impairment: decreased flexibility  Functional limitations: difficulty with stair negotiationUnderstanding of Dx/Px/POC: good   Prognosis: good    Goals  STGs:  1.  Initiate and complete HEP with verbal cues.  2.  Decrease R knee pain by > 25% in 4 weeks.  3.  Improve R knee ROM by > 5-10 degrees in 4 weeks.  4.  Improve B LE strength by 1/2-1 grade in 4 weeks.  LTGs:  1.  Patient to be I with HEP in 12 weeks.  2. Improve R knee ROM to 0-120 degrees in 12 weeks to improve function.    3. Improve B LE strength to 4+ to 5/5 t/o in 12 weeks to improve function.  4. Decrease B knee pain to < or = to 3-4/10 with activity in 12 weeks to improve function.  5.  Patient to ambulate with normalized gait pattern in 12 weeks.  6.  Stair  "negotiation is improved to PLOF in 12 weeks.  7.  Recreational performance is improved to PLOF in 12 weeks.  8.  ADL performance is improved to PLOF in 12 weeks.  9.  Improve 5x STS to < 14\" without use of UE in 12 weeks.         Plan  Plan details: Modalities and therapy interventions prn.    Patient would benefit from: skilled physical therapy  Planned modality interventions: cryotherapy and thermotherapy: hydrocollator packs  Planned therapy interventions: joint mobilization, balance, balance/weight bearing training, manual therapy, neuromuscular re-education, patient education, postural training, self care, strengthening, stretching, therapeutic exercise, therapeutic activities, flexibility, functional ROM exercises, gait training and home exercise program  Frequency: 2x week  Duration in weeks: 12  Plan of Care beginning date: 1/18/2024  Plan of Care expiration date: 4/11/2024  Treatment plan discussed with: patient      Subjective Evaluation    History of Present Illness  Mechanism of injury: The patient states that her pain started in the R knee first about three months ago with no known cause.  She notes that she was driving and spilled hot soup on her lap, she had told the doctor this but per patient the doctor does not feel her pain is coming from this incident.  Then about one month later she developed L knee pain.   She had been to see the doctor on 12/3/23.  No imaging was completed.  She referred to OPPT for B knee pain and she now presents for her evaluation.  She will be going back to see the doctor in about two months for her next appointment (typically sees the doctor every three months for her diabetes).      She notes that her pain has been staying about the same since she saw the doctor last month.  She notes that her R knee hurts > L knee.  Most pain is along her medial knee but at times with intermittent pain around her kneecap and down towards her shin.  She denies any N&T into B knees, " "denies any swelling in her L knee, notes swelling in her R knee (mostly along inner knee).     She also has complaints of stiffness in her knees, especially first thing in the morning or if sitting to long and goes to stand up.    She does note intermittent sensation of knees feeling like they want to buckle on her.  She reports that she likes to walk on the treadmill for exercise to help with her diabetes but she has been unable to do this because of her knee pain.      Patient Goals  Patient goals for therapy: decreased pain, increased motion, increased strength and return to sport/leisure activities  Patient goal: \"To help get rid of the pain in my knees, to be able to walk like I used too.\"  Pain  At best pain ratin  At worst pain ratin  Location: R Knee > L Knee, mostly along her medial knee  Quality: sharp, dull ache, discomfort and tight  Alleviating factors: Icy Hot.  Aggravating factors: walking and standing (going from seated to standing position, WBing activities)    Social Support  Steps to enter house: no  Stairs in house: yes   Lives in: multiple-level home  Lives with: spouse    Employment status: not working      Objective     Tenderness   Left Knee   Tenderness in the medial joint line. No tenderness in the lateral joint line.     Right Knee   Tenderness in the medial joint line. No tenderness in the lateral joint line.     Neurological Testing     Sensation     Knee   Left Knee   Intact: Light touch    Right Knee   Intact: light touch     Active Range of Motion   Left Knee   Flexion: 120 degrees   Extension: 0 degrees     Right Knee   Flexion: 106 degrees with pain  Extension: -3 degrees with pain    Additional Active Range of Motion Details  L SLR: 50  R SLR: 30/45    Passive Range of Motion     Right Knee   Flexion: 110 degrees with pain  Extension: -1 degrees with pain    Mobility   Patellar Mobility:   Left Knee   WFL: medial and lateral.     Right Knee   WFL: medial and " "lateral    Strength/Myotome Testing     Left Hip   Planes of Motion   Flexion: 4+  Adduction: WFL    Right Hip   Planes of Motion   Flexion: 4  Adduction: WFL    Left Knee   Flexion: 4+  Extension: 4+  Quadriceps contraction: good    Right Knee   Flexion: 4-  Extension: 4  Quadriceps contraction: fair    Swelling     Left Knee Girth Measurement (cm)   Joint line: 42 cm    Right Knee Girth Measurement (cm)   Joint line: 43 cm    Ambulation   Weight-Bearing Status   Assistive device used: none    Ambulation: Level Surfaces   Ambulation without assistive device: independent    Ambulation: Stairs   Ascend stairs: independent  Pattern: non-reciprocal  Descend stairs: independent  Pattern: non-reciprocal    Observational Gait   Decreased walking speed.     Additional Observational Gait Details  Lacks TKE on R with heel strike.    Neuro Exam:     Functional outcomes   5x sit to stand: 22\" (seconds)                  POC expires Unit limit Auth Expiration date PT/OT/ST + Visit Limit?   4/11/24 NA NA BOMN                           Visit/Unit Tracking  AUTH Status:  Date 1/18              N/A - BOMN Used 1               Remaining                      Precautions: DM, HTN, Hypothyroidism       Manuals 1/18       B LE - Hams, Calf        R Knee                        Neuro Re-Ed         BOSU Lunges        SLS        Tandem Stance         Sidestepping        TKE w/TBand                        Ther Ex        NuStep L2 7'       HR/TR HR - HEP       SLR x 3 Abd/Ext - HEP       LAQ Madhu 3\"x10       Marches Seated Madhu 10x       QSets        SAQ        SLR        Heel Slides        Bridges        S/L Hip Abd        Clamshells                Ther Activity        Stepups F/L        Stepdowns        STS        Gait Training                        Modalities        HP/CP prn                          Access Code: [email or web address]  URL: [email or web address]  Date: 01/18/2024  Prepared by: Zofia Dunne  - Seated Long Arc Quad  - " "1 x daily - 7 x weekly - 1 sets - 10 reps - 3\" hold  - Seated March  - 1 x daily - 7 x weekly - 1 sets - 10 reps  - Standing Heel Raise with Support  - 1 x daily - 7 x weekly - 1 sets - 10 reps  - Standing Hip Abduction  - 1 x daily - 7 x weekly - 1 sets - 10 reps  - Standing Hip Extension with Chair  - 1 x daily - 7 x weekly - 1 sets - 10 reps                  "

## 2024-01-22 ENCOUNTER — APPOINTMENT (OUTPATIENT)
Dept: PHYSICAL THERAPY | Facility: CLINIC | Age: 74
End: 2024-01-22
Payer: MEDICARE

## 2024-01-22 DIAGNOSIS — M25.562 PAIN IN BOTH KNEES, UNSPECIFIED CHRONICITY: Primary | ICD-10-CM

## 2024-01-22 DIAGNOSIS — M25.561 PAIN IN BOTH KNEES, UNSPECIFIED CHRONICITY: Primary | ICD-10-CM

## 2024-01-22 DIAGNOSIS — R26.89 IMPAIRED GAIT AND MOBILITY: ICD-10-CM

## 2024-01-22 NOTE — PROGRESS NOTES
"Daily Note     Today's date: 2024  Patient name: Xochitl Madrigal  : 1950  MRN: [id number]  Referring provider: Siegler, Eugenia L, MD  Dx:   Encounter Diagnosis     ICD-10-CM    1. Pain in both knees, unspecified chronicity  M25.561     M25.562       2. Impaired gait and mobility  R26.89                      Subjective: ***      Objective: See treatment diary below      Assessment: Tolerated treatment well. Patient demonstrated fatigue post treatment and would benefit from continued PT.       Plan: Continue per plan of care.  Progress treatment as tolerated.            POC expires Unit limit Auth Expiration date PT/OT/ST + Visit Limit?   24 NA NA BOMN                           Visit/Unit Tracking  AUTH Status:  Date              N/A - BOMN Used 1 2              Remaining                      Precautions: DM, HTN, Hypothyroidism       Manuals       B LE - Hams, Calf        R Knee                        Neuro Re-Ed         BOSU Lunges        SLS        Tandem Stance         Sidestepping        TKE w/TBand                        Ther Ex        NuStep L2 7'       HR/TR HR - HEP       SLR x 3 Abd/Ext - HEP       LAQ Madhu 3\"x10       Marches Seated Madhu 10x       QSets        SAQ        SLR        Heel Slides        Bridges        S/L Hip Abd        Clamshells                Ther Activity        Stepups F/L        Stepdowns        STS        Gait Training                        Modalities        HP/CP prn                            "

## 2024-01-24 ENCOUNTER — OFFICE VISIT (OUTPATIENT)
Dept: PHYSICAL THERAPY | Facility: CLINIC | Age: 74
End: 2024-01-24
Payer: MEDICARE

## 2024-01-24 DIAGNOSIS — M25.561 PAIN IN BOTH KNEES, UNSPECIFIED CHRONICITY: Primary | ICD-10-CM

## 2024-01-24 DIAGNOSIS — R26.89 IMPAIRED GAIT AND MOBILITY: ICD-10-CM

## 2024-01-24 DIAGNOSIS — M25.562 PAIN IN BOTH KNEES, UNSPECIFIED CHRONICITY: Primary | ICD-10-CM

## 2024-01-24 PROCEDURE — 97530 THERAPEUTIC ACTIVITIES: CPT

## 2024-01-24 PROCEDURE — 97110 THERAPEUTIC EXERCISES: CPT

## 2024-01-24 NOTE — PROGRESS NOTES
"Daily Note     Today's date: 2024  Patient name: Xochitl Madrigal  : 1950  MRN: [id number]  Referring provider: Siegler, Eugenia L, MD  Dx:   Encounter Diagnosis     ICD-10-CM    1. Pain in both knees, unspecified chronicity  M25.561     M25.562       2. Impaired gait and mobility  R26.89           Start Time: 937  Stop Time: 1015  Total time in clinic (min): 38 minutes    Subjective: pt noted no changes since IE.       Objective: See treatment diary below      Assessment:  Continued with treatment session with focus on b/l knees as well as balance and gait. Pt was able to complete all exercises within pain free ranges. Demonstrated proper form with verbal cues. Tolerated treatment well w/ increase challenge with standing on LLE while completing standing hip 2 ways this visit.  Patient exhibited good technique with therapeutic exercises and would benefit from continued PT. S/p treatment session noted some fatigue in b/l LE's.     Education was reviewed w/ pt verbal agreement and understanding.   - HEP compliance.   - DOME 24 to 48 hours of muscle soreness.     Plan: Continue per plan of care.  Progress as able.           POC expires Unit limit Auth Expiration date PT/OT/ST + Visit Limit?   24 NA NA 24 BOMN                           Visit/Unit Tracking  AUTH Status:  Date              N/A - BOMN Used 1 2              Remaining                      Precautions: DM, HTN, Hypothyroidism       Manuals       B LE - Hams, Calf  NV      R Knee  NV                      Neuro Re-Ed         BOSU Lunges        SLS        Tandem Stance         Sidestepping w/ foam.         TKE w/TBand                        Ther Ex        NuStep/ bike   ROM and cardio/endurance L2 7' L1 5 min rec bike      HR/TR HR - HEP       SLR x 3 Abd/Ext - HEP Standing   10x  b/l  abd and ext.  Add TB      LAQ Madhu 3\"x10 B/l 2x 10       Marches Seated Madhu 10x       QSets        SAQ        SLR        Heel Slides   " "     Bridges        S/L Hip Abd        Clamshells                Ther Activity        Stepups F/ L  b/l LE   6\" 10x   ( L up and over)  u/l UE         Stepdowns        STS  10x no UE       TG squats   L22 2x 10                               Gait Training                        Modalities        HP/CP prn                              "

## 2024-01-26 ENCOUNTER — OFFICE VISIT (OUTPATIENT)
Dept: PHYSICAL THERAPY | Facility: CLINIC | Age: 74
End: 2024-01-26
Payer: MEDICARE

## 2024-01-26 DIAGNOSIS — M25.561 PAIN IN BOTH KNEES, UNSPECIFIED CHRONICITY: Primary | ICD-10-CM

## 2024-01-26 DIAGNOSIS — M25.562 PAIN IN BOTH KNEES, UNSPECIFIED CHRONICITY: Primary | ICD-10-CM

## 2024-01-26 DIAGNOSIS — R26.89 IMPAIRED GAIT AND MOBILITY: ICD-10-CM

## 2024-01-26 PROCEDURE — 97110 THERAPEUTIC EXERCISES: CPT | Performed by: PHYSICAL THERAPIST

## 2024-01-26 PROCEDURE — 97530 THERAPEUTIC ACTIVITIES: CPT | Performed by: PHYSICAL THERAPIST

## 2024-01-26 NOTE — PROGRESS NOTES
"Daily Note     Today's date: 2024  Patient name: Xochitl Madrigal  : 1950  MRN: [id number]  Referring provider: Siegler, Eugenia L, MD  Dx:   Encounter Diagnosis     ICD-10-CM    1. Pain in both knees, unspecified chronicity  M25.561     M25.562       2. Impaired gait and mobility  R26.89           Start Time: 1128  Stop Time: 1200  Total time in clinic (min): 32 minutes    Subjective: Patient reports that her knees are stiff d/t the weather.      Objective: See treatment diary below      Assessment: Tolerated treatment well. Patient demonstrated fatigue post treatment and would benefit from continued PT. Patient reported increased muscle soreness at end of session. Unable to complete full treatment session secondary to time constraints.      Plan: Continue per plan of care.  Progress treatment as tolerated.       POC expires Unit limit Auth Expiration date PT/OT/ST + Visit Limit?   24 NA NA 24 BOMN                           Visit/Unit Tracking  AUTH Status:  Date             N/A - BOMN Used 1 2 3             Remaining                      Precautions: DM, HTN, Hypothyroidism       Manuals      B LE - Hams, Calf  NV      R Knee  NV                      Neuro Re-Ed         BOSU Lunges        SLS        Tandem Stance         Sidestepping w/ foam.         TKE w/TBand                        Ther Ex        NuStep/ bike   ROM and cardio/endurance L2 7' L1 5 min rec bike L1 5 min rec bike     HR/TR HR - HEP       SLR x 3 Abd/Ext - HEP Standing   10x  b/l  abd and ext.  Add TB      LAQ Madhu 3\"x10 B/l 2x 10       Marches Seated Madhu 10x       QSets        SAQ        SLR        Heel Slides        Bridges        S/L Hip Abd        Clamshells        Sidestepping with TB at railq   RTB 5 laps     Ther Activity        Stepups F/ L  b/l LE   6\" 10x   ( L up and over)  u/l UE    F: 8\" step x10 ea.  LSD: 6\" step: x10 ea.     Stepdowns        STS  10x no UE       TG squats   L22 2x " 10  L22 3x10                             Gait Training                        Modalities        HP/CP prn

## 2024-01-30 ENCOUNTER — OFFICE VISIT (OUTPATIENT)
Dept: PHYSICAL THERAPY | Facility: CLINIC | Age: 74
End: 2024-01-30
Payer: MEDICARE

## 2024-01-30 DIAGNOSIS — M25.562 PAIN IN BOTH KNEES, UNSPECIFIED CHRONICITY: Primary | ICD-10-CM

## 2024-01-30 DIAGNOSIS — R26.89 IMPAIRED GAIT AND MOBILITY: ICD-10-CM

## 2024-01-30 DIAGNOSIS — M25.561 PAIN IN BOTH KNEES, UNSPECIFIED CHRONICITY: Primary | ICD-10-CM

## 2024-01-30 PROCEDURE — 97530 THERAPEUTIC ACTIVITIES: CPT

## 2024-01-30 PROCEDURE — 97110 THERAPEUTIC EXERCISES: CPT

## 2024-01-30 NOTE — PROGRESS NOTES
"Daily Note     Today's date: 2024  Patient name: Xochitl Madrigal  : 1950  MRN: [id number]  Referring provider: Siegler, Eugenia L, MD  Dx:   Encounter Diagnosis     ICD-10-CM    1. Pain in both knees, unspecified chronicity  M25.561     M25.562       2. Impaired gait and mobility  R26.89                      Subjective: Patient note no new complaints. Patient noted no c/o soreness post last treatment.       Objective: See treatment diary below      Assessment: Tolerated treatment fair. Patient was able to perform listed exercises without complaints. Added exercises into treatment patient was able to perform without complaints. Patient would benefit from continued PT Patient arrived late to treatment was accommodated.       Plan: Continue per plan of care.      POC expires Unit limit Auth Expiration date PT/OT/ST + Visit Limit?   24 NA NA 24 BOMN                           Visit/Unit Tracking  AUTH Status:  Date            N/A - BOMN Used 1 2 3 4            Remaining                      Precautions: DM, HTN, Hypothyroidism       Manuals     B LE - Hams, Calf  NV  AC calf stretch    R Knee  NV                      Neuro Re-Ed         BOSU Lunges        SLS        Tandem Stance         Sidestepping w/ foam.         TKE w/TBand                        Ther Ex        NuStep/ bike   ROM and cardio/endurance L2 7' L1 5 min rec bike L1 5 min rec bike L4 NuStep 5 min    HR/TR HR - HEP   X 20    SLR x 3 Abd/Ext - HEP Standing   10x  b/l  abd and ext.  Add TB  YTB 10x b/l abd,ext    LAQ Madhu 3\"x10 B/l 2x 10   Madhu x 10    Marches Seated Madhu 10x       QSets    5\" x 10    SAQ        SLR    10x ea    Heel Slides        Bridges        S/L Hip Abd        Clamshells        Sidestepping with TB at railq   RTB 5 laps RTB 5 laps    Ther Activity        Stepups F/ L  b/l LE   6\" 10x   ( L up and over)  u/l UE    F: 8\" step x10 ea.  LSD: 6\" step: x10 ea. F: 8\" step x 10 ea " "NV  LSD: 6\" step: x10 ea.    Stepdowns        STS  10x no UE       TG squats   L22 2x 10  L22 3x10                             Gait Training                        Modalities        HP/CP prn                                "

## 2024-02-01 ENCOUNTER — OFFICE VISIT (OUTPATIENT)
Dept: PHYSICAL THERAPY | Facility: CLINIC | Age: 74
End: 2024-02-01
Payer: MEDICARE

## 2024-02-01 DIAGNOSIS — M25.561 PAIN IN BOTH KNEES, UNSPECIFIED CHRONICITY: Primary | ICD-10-CM

## 2024-02-01 DIAGNOSIS — M25.562 PAIN IN BOTH KNEES, UNSPECIFIED CHRONICITY: Primary | ICD-10-CM

## 2024-02-01 DIAGNOSIS — R26.89 IMPAIRED GAIT AND MOBILITY: ICD-10-CM

## 2024-02-01 PROCEDURE — 97530 THERAPEUTIC ACTIVITIES: CPT

## 2024-02-01 PROCEDURE — 97110 THERAPEUTIC EXERCISES: CPT

## 2024-02-01 NOTE — PROGRESS NOTES
Daily Note     Today's date: 2024  Patient name: Xochitl Madrigal  : 1950  MRN: [id number]  Referring provider: Siegler, Eugenia L, MD  Dx:   Encounter Diagnosis     ICD-10-CM    1. Pain in both knees, unspecified chronicity  M25.561     M25.562       2. Impaired gait and mobility  R26.89           Start Time: 1045  Stop Time: 1131  Total time in clinic (min): 46 minutes    Subjective: Pt noted that she has been feeling good and having no changes in pain since last visit.       Objective: See treatment diary below      Assessment:  Continued with treatment session with focus on b/l knee strengthening. Pt was able to completed with no changes in pain status. Demonstrated proper technique w/ appropriate verbal cues as needed. Challenged with hip abd and lateral walks with noting some increase in fatigue/ muscle soreness. Tolerated treatment well. Patient exhibited good technique with therapeutic exercises.     Education reviewed with patient with importance of HEP on a daily to every other day basis. Provided patient with a Hep print out as well as TB this date. Advised she may have some increase in DOMS secondary to PT as well.       Plan: Continue per plan of care.      POC expires Unit limit Auth Expiration date PT/OT/ST + Visit Limit?   24 NA NA 24 BOMN                           Visit/Unit Tracking  AUTH Status:  Date           N/A - BOMN Used 1 2 3 4 5           Remaining                      Precautions: DM, HTN, Hypothyroidism   Access Code: [email or web address] - updated on 24    Manuals    B LE - Hams, Calf  NV  AC calf stretch    R Knee  NV                      Neuro Re-Ed         BOSU Lunges        SLS        Tandem Stance         Sidestepping w/ foam.         TKE w/TBand                        Ther Ex        NuStep/ bike   ROM and cardio/endurance L2 7' L1 5 min rec bike L1 5 min rec bike L4 NuStep 5 min L4 10 min with UE at 11    HR/TR HR -  "HEP   X 20    SLR x 3 Abd/Ext - HEP Standing   10x  b/l  abd and ext.  Add TB  YTB 10x b/l abd,ext YTB 2x 10 abd and ext.    LAQ Madhu 3\"x10 B/l 2x 10   Madhu x 10    Marches Seated Madhu 10x       QSets    5\" x 10    SAQ        SLR    10x ea    Heel Slides        Bridges        S/L Hip Abd        Clamshells        Sidestepping with TB at railq   RTB 5 laps RTB 5 laps YTB 3 laps at mirror                    Ther Activity        Stepups F/ L  b/l LE   6\" 10x   ( L up and over)  u/l UE    F: 8\" step x10 ea.  LSD: 6\" step: x10 ea. F: 8\" step x 10 ea NV  LSD: 6\" step: x10 ea. F: 8\" step x 10 ea NV  LSD: 6\" step: x10 ea.   Stepdowns     6\" 10x    STS  10x no UE    10x no UE   TG squats   L22 2x 10  L22 3x10  L22 2x 10                            Gait Training                        Modalities        HP/CP prn                                   1 on 1 time for 38 minutes on 2/1/24.   "

## 2024-02-06 ENCOUNTER — OFFICE VISIT (OUTPATIENT)
Dept: PHYSICAL THERAPY | Facility: CLINIC | Age: 74
End: 2024-02-06
Payer: MEDICARE

## 2024-02-06 DIAGNOSIS — M25.561 PAIN IN BOTH KNEES, UNSPECIFIED CHRONICITY: Primary | ICD-10-CM

## 2024-02-06 DIAGNOSIS — R26.89 IMPAIRED GAIT AND MOBILITY: ICD-10-CM

## 2024-02-06 DIAGNOSIS — M25.562 PAIN IN BOTH KNEES, UNSPECIFIED CHRONICITY: Primary | ICD-10-CM

## 2024-02-06 PROCEDURE — 97110 THERAPEUTIC EXERCISES: CPT | Performed by: PHYSICAL THERAPIST

## 2024-02-06 PROCEDURE — 97530 THERAPEUTIC ACTIVITIES: CPT | Performed by: PHYSICAL THERAPIST

## 2024-02-06 NOTE — PROGRESS NOTES
"Daily Note     Today's date: 2024  Patient name: Xochitl Madrigal  : 1950  MRN: [id number]  Referring provider: Siegler, Eugenia L, MD  Dx:   Encounter Diagnosis     ICD-10-CM    1. Pain in both knees, unspecified chronicity  M25.561     M25.562       2. Impaired gait and mobility  R26.89           Start Time: 1050  Stop Time: 1132  Total time in clinic (min): 42 minutes    Subjective: Patient reports that her legs have been less painful.      Objective: See treatment diary below      Assessment: Tolerated treatment well. Patient demonstrated fatigue post treatment and would benefit from continued PT. Patient challenged and fatigued with progressions, reporting increased muscular soreness at end of session. Reports of L LE weakness and R knee pain during lunges.      Plan: Continue per plan of care.  Progress treatment as tolerated.       POC expires Unit limit Auth Expiration date PT/OT/ST + Visit Limit?   24 NA NA 24 BOMN                           Visit/Unit Tracking  AUTH Status:  Date          N/A - BOMN Used 1 2 3 4 5 6          Remaining                      Precautions: DM, HTN, Hypothyroidism   Access Code: [email or web address] - updated on 24    Manuals    B LE - Hams, Calf    AC calf stretch    R Knee                        Neuro Re-Ed         BOSU Lunges        SLS        Tandem Stance         Sidestepping w/ foam.         TKE w/TBand                        Ther Ex        NuStep/ bike   ROM and cardio/endurance L5 10 min with UEs  L1 5 min rec bike L4 NuStep 5 min L4 10 min with UE at 11    HR/TR    X 20    SLR x 3   Add TB  YTB 10x b/l abd,ext YTB 2x 10 abd and ext.    LAQ    Madhu x 10    Marches        QSets    5\" x 10    SAQ        SLR    10x ea    Supine hip flexor str. 3x30\" b/l       Bridges        S/L Hip Abd        Clamshells        Sidestepping with TB at railq   RTB 5 laps RTB 5 laps YTB 3 laps at mirror    Matrix: knee flexion 30# " "2x10       Matrix: knee extension 20# 2x10       Ther Activity        Stepups F/ L  b/l LE    F: 8\" step x10 ea.  LSD: 6\" step: x10 ea. F: 8\" step x 10 ea NV  LSD: 6\" step: x10 ea. F: 8\" step x 10 ea NV  LSD: 6\" step: x10 ea.   Stepdowns     6\" 10x    STS     10x no UE   TG squats  L22 3x10  L22 3x10  L22 2x 10    Lunges 2x10                       Gait Training                        Modalities        HP/CP prn                                    "

## 2024-02-08 ENCOUNTER — OFFICE VISIT (OUTPATIENT)
Dept: PHYSICAL THERAPY | Facility: CLINIC | Age: 74
End: 2024-02-08
Payer: MEDICARE

## 2024-02-08 DIAGNOSIS — M25.561 PAIN IN BOTH KNEES, UNSPECIFIED CHRONICITY: Primary | ICD-10-CM

## 2024-02-08 DIAGNOSIS — M25.562 PAIN IN BOTH KNEES, UNSPECIFIED CHRONICITY: Primary | ICD-10-CM

## 2024-02-08 DIAGNOSIS — R26.89 IMPAIRED GAIT AND MOBILITY: ICD-10-CM

## 2024-02-08 PROCEDURE — 97530 THERAPEUTIC ACTIVITIES: CPT

## 2024-02-08 PROCEDURE — 97110 THERAPEUTIC EXERCISES: CPT

## 2024-02-08 NOTE — PROGRESS NOTES
"Daily Note     Today's date: 2024  Patient name: Xochitl Madrigal  : 1950  MRN: [id number]  Referring provider: Siegler, Eugenia L, MD  Dx:   Encounter Diagnosis     ICD-10-CM    1. Pain in both knees, unspecified chronicity  M25.561     M25.562       2. Impaired gait and mobility  R26.89           Start Time: 1207  Stop Time: 1245  Total time in clinic (min): 38 minutes    Subjective: Pt noted that no changes since last visit and feeling pretty good.       Objective: See treatment diary below      Assessment:  Continued with treatment session with minimal progressions at this time secondary to challenged with exercises performed at this time. Tolerated treatment well. Challenge noted with lunges. Required verbal cues for proper technique at this time. Patient exhibited good technique with therapeutic exercises and would benefit from continued PT. S/p treatment,  session noted some fatigue and muscle soreness in knees.       Plan: Continue per plan of care.      POC expires Unit limit Auth Expiration date PT/OT/ST + Visit Limit?   24 NA NA 24 BOMN                           Visit/Unit Tracking  AUTH Status:  Date         N/A - BOMN Used 1 2 3 4 5 6 7         Remaining                      Precautions: DM, HTN, Hypothyroidism   Access Code: [email or web address] - updated on 24    Manuals    B LE - Hams, Calf    AC calf stretch    R Knee                        Neuro Re-Ed         BOSU Lunges        SLS        Tandem Stance         Sidestepping w/ foam.         TKE w/TBand                        Ther Ex        NuStep/ bike   ROM and cardio/endurance L5 10 min with UEs L5 10 min with UE   L4 NuStep 5 min L4 10 min with UE at 11    HR/TR    X 20    SLR x 3    YTB 10x b/l abd,ext YTB 2x 10 abd and ext.    LAQ    Madhu x 10    Marches        QSets    5\" x 10    SAQ        SLR    10x ea    Supine hip flexor str. 3x30\" b/l       Bridges        S/L Hip Abd      " "  Clamshells        Sidestepping with TB at railq    RTB 5 laps YTB 3 laps at mirror    Matrix: knee flexion 30# 2x10 25# 2x10       Matrix: knee extension 20# 2x10 15# 2x 10       Ther Activity        Stepups F/ L  b/l LE   F 8\" step 10x b/l   F: 8\" step x 10 ea NV  LSD: 6\" step: x10 ea. F: 8\" step x 10 ea NV  LSD: 6\" step: x10 ea.   Stepdowns  Lateral 6\"10x b/l    6\" 10x    STS     10x no UE   TG squats  L22 3x10 L22 3x 10    L22 2x 10    Lunges 2x10 10x                       Gait Training                        Modalities        HP/CP prn                                      "

## 2024-02-13 ENCOUNTER — APPOINTMENT (OUTPATIENT)
Dept: PHYSICAL THERAPY | Facility: CLINIC | Age: 74
End: 2024-02-13
Payer: MEDICARE

## 2024-02-13 ENCOUNTER — TELEPHONE (OUTPATIENT)
Dept: PHYSICAL THERAPY | Facility: CLINIC | Age: 74
End: 2024-02-13

## 2024-02-13 NOTE — TELEPHONE ENCOUNTER
Called patient to reschedule appointment on 2/13 due to inclement weather. No answer.    Jose Carlos Arguello, PT  11:29 AM.  2/13/2024

## 2024-02-14 ENCOUNTER — OFFICE VISIT (OUTPATIENT)
Dept: PHYSICAL THERAPY | Facility: CLINIC | Age: 74
End: 2024-02-14
Payer: MEDICARE

## 2024-02-14 DIAGNOSIS — M25.562 PAIN IN BOTH KNEES, UNSPECIFIED CHRONICITY: Primary | ICD-10-CM

## 2024-02-14 DIAGNOSIS — R26.89 IMPAIRED GAIT AND MOBILITY: ICD-10-CM

## 2024-02-14 DIAGNOSIS — M25.561 PAIN IN BOTH KNEES, UNSPECIFIED CHRONICITY: Primary | ICD-10-CM

## 2024-02-14 PROCEDURE — 97530 THERAPEUTIC ACTIVITIES: CPT

## 2024-02-14 PROCEDURE — 97112 NEUROMUSCULAR REEDUCATION: CPT

## 2024-02-14 PROCEDURE — 97110 THERAPEUTIC EXERCISES: CPT

## 2024-02-14 NOTE — PROGRESS NOTES
"Daily Note     Today's date: 2024  Patient name: Xochitl Madrigal  : 1950  MRN: [id number]  Referring provider: Siegler, Eugenia L, MD  Dx:   Encounter Diagnosis     ICD-10-CM    1. Pain in both knees, unspecified chronicity  M25.561     M25.562       2. Impaired gait and mobility  R26.89           Start Time: 1100  Stop Time: 1141  Total time in clinic (min): 41 minutes    Subjective: Patient reports her knees are feeling good and offers no new complaints/changes since lv.       Objective: See treatment diary below      Assessment: Patient tolerated treatment session well. Initiated warm-up on stationary bike which was challenging and fatiguing but overall tolerable for patient. Progressed program to include balance interventions to challenge neuromuscular control and stability. Patient required finger tip assistance on rail to remain balanced. Occasional VC's required to correct technique/form with good carryover exhibited. Patient left clinic in good condition and would benefit from continued stretching/strengthening to restore functional deficits.       Plan: Continue per POC. Increase reps/resistance as tolerated.      POC expires Unit limit Auth Expiration date PT/OT/ST + Visit Limit?   24 NA NA 24 BOMN                           Visit/Unit Tracking  AUTH Status:  Date        N/A - BOMN Used 1 2 3 4 5 6 7 8        Remaining                      Precautions: DM, HTN, Hypothyroidism   Access Code: [email or web address] - updated on 24    Manuals    B LE - Hams, Calf    AC calf stretch    R Knee                        Neuro Re-Ed         BOSU Lunges        SLS        Tandem Stance    15\"x3 ea b/l     Sidestepping w/ foam.    Foam 3 laps     TKE w/TBand                        Ther Ex        NuStep/ bike   ROM and cardio/endurance L5 10 min with UEs L5 10 min with UE  Bike L1 x 5' L4 NuStep 5 min L4 10 min with UE at 11    HR/TR    X 20  " "  SLR x 3    YTB 10x b/l abd,ext YTB 2x 10 abd and ext.    LAQ    Madhu x 10    Marches        QSets    5\" x 10    SAQ        SLR    10x ea    Supine hip flexor str. 3x30\" b/l       Bridges        S/L Hip Abd        Clamshells        Sidestepping with TB at railq    RTB 5 laps YTB 3 laps at mirror    Matrix: knee flexion 30# 2x10 25# 2x10  25# 3x10     Matrix: knee extension 20# 2x10 15# 2x 10  15# 2x10      Ther Activity        Stepups F/ L  b/l LE   F 8\" step 10x b/l  Fwd 8\" step 15x b/l   Lat 8\" step 10x b/l F: 8\" step x 10 ea NV  LSD: 6\" step: x10 ea. F: 8\" step x 10 ea NV  LSD: 6\" step: x10 ea.   Stepdowns  Lateral 6\"10x b/l  Lateral 6\"10x b/l   6\" 10x    STS     10x no UE   TG squats  L22 3x10 L22 3x 10  L22 3x 10   L22 2x 10    Lunges 2x10 10x  10x                     Gait Training                        Modalities        HP/CP prn                                        "

## 2024-02-15 ENCOUNTER — OFFICE VISIT (OUTPATIENT)
Dept: PHYSICAL THERAPY | Facility: CLINIC | Age: 74
End: 2024-02-15
Payer: MEDICARE

## 2024-02-15 DIAGNOSIS — M25.561 PAIN IN BOTH KNEES, UNSPECIFIED CHRONICITY: Primary | ICD-10-CM

## 2024-02-15 DIAGNOSIS — R26.89 IMPAIRED GAIT AND MOBILITY: ICD-10-CM

## 2024-02-15 DIAGNOSIS — M25.562 PAIN IN BOTH KNEES, UNSPECIFIED CHRONICITY: Primary | ICD-10-CM

## 2024-02-15 PROCEDURE — 97110 THERAPEUTIC EXERCISES: CPT | Performed by: PHYSICAL THERAPIST

## 2024-02-15 NOTE — PROGRESS NOTES
"Daily Note     Today's date: 2/15/2024  Patient name: Xochitl Madrigal  : 1950  MRN: [id number]  Referring provider: Siegler, Eugenia L, MD  Dx:   Encounter Diagnosis     ICD-10-CM    1. Pain in both knees, unspecified chronicity  M25.561     M25.562       2. Impaired gait and mobility  R26.89           Start Time: 1200  Stop Time: 1230  Total time in clinic (min): 30 minutes    Subjective: Patient reports that she feels that her knees are getting stronger. Patient has to leave early d/t overbooking herself with a 2nd appointment.      Objective: See treatment diary below      Assessment: Tolerated treatment well. Patient demonstrated fatigue post treatment and would benefit from continued PT. Patient unable to complete full treatment session secondary to time constraints.      Plan: Continue per plan of care.  Potential discharge next visit.     POC expires Unit limit Auth Expiration date PT/OT/ST + Visit Limit?   24 NA NA 24 BOMN                           Visit/Unit Tracking  AUTH Status:  Date 1/18 1/24 1/26 1/30 2/1 2/6 2/8 2/14 2/15      N/A - BOMN Used 1 2 3 4 5 6 7 8 9       Remaining                      Precautions: DM, HTN, Hypothyroidism   Access Code: [email or web address] - updated on 24    Manuals 2/6 2/8 2/14 2/15    B LE - Hams, Calf        R Knee                        Neuro Re-Ed         BOSU Lunges        SLS        Tandem Stance    15\"x3 ea b/l     Sidestepping w/ foam.    Foam 3 laps     TKE w/TBand                        Ther Ex        NuStep/ bike   ROM and cardio/endurance L5 10 min with UEs L5 10 min with UE  Bike L1 x 5' Nustep L6 7 min    HR/TR        SLR x 3        LAQ        Marches        QSets        SAQ        SLR        Supine hip flexor str. 3x30\" b/l       Bridges        S/L Hip Abd        Clamshells        Sidestepping with TB at railq        Matrix: knee flexion 30# 2x10 25# 2x10  25# 3x10 25# 3x10    Matrix: knee extension 20# 2x10 15# 2x 10  15# 2x10  15# 3x10  " "  Ther Activity        Stepups F/ L  b/l LE   F 8\" step 10x b/l  Fwd 8\" step 15x b/l   Lat 8\" step 10x b/l     Stepdowns  Lateral 6\"10x b/l  Lateral 6\"10x b/l      STS        TG squats  L22 3x10 L22 3x 10  L22 3x 10  L22 3x10    Lunges 2x10 10x  10x                     Gait Training                        Modalities        HP/CP prn                                          "

## 2024-02-20 ENCOUNTER — EVALUATION (OUTPATIENT)
Dept: PHYSICAL THERAPY | Facility: CLINIC | Age: 74
End: 2024-02-20
Payer: MEDICARE

## 2024-02-20 DIAGNOSIS — M25.562 PAIN IN BOTH KNEES, UNSPECIFIED CHRONICITY: Primary | ICD-10-CM

## 2024-02-20 DIAGNOSIS — M25.561 PAIN IN BOTH KNEES, UNSPECIFIED CHRONICITY: Primary | ICD-10-CM

## 2024-02-20 DIAGNOSIS — R26.89 IMPAIRED GAIT AND MOBILITY: ICD-10-CM

## 2024-02-20 PROCEDURE — 97110 THERAPEUTIC EXERCISES: CPT | Performed by: PHYSICAL THERAPIST

## 2024-02-20 NOTE — LETTER
2024    Eugenia L Siegler, MD  [address] [address]    Patient: Xochitl Madrigal   YOB: 1950   Date of Visit: 2024     Encounter Diagnosis     ICD-10-CM    1. Pain in both knees, unspecified chronicity  M25.561     M25.562       2. Impaired gait and mobility  R26.89           Dear Dr. Siegler:    Thank you for your recent referral of Xochitl Madrigal. Please review the attached evaluation summary from Xochitl's recent visit.     Please verify that you agree with the plan of care by signing the attached order.     If you have any questions or concerns, please do not hesitate to call.     I sincerely appreciate the opportunity to share in the care of one of your patients and hope to have another opportunity to work with you in the near future.       Sincerely,    Mic Perez, PT      Referring Provider:      I certify that I have read the below Plan of Care and certify the need for these services furnished under this plan of treatment while under my care.                    Eugenia L Siegler, MD  [address] [address]  Via Fax: [phone]          PT Re-Evaluation     Today's date: 2024  Patient name: Xochitl Madrigal  : 1950  MRN: [id number]  Referring provider: Siegler, Eugenia L, MD  Dx:   Encounter Diagnosis     ICD-10-CM    1. Pain in both knees, unspecified chronicity  M25.561     M25.562       2. Impaired gait and mobility  R26.89           Start Time: 1050  Stop Time: 1150  Total time in clinic (min): 60 minutes    Assessment  Assessment details: Since beginning physical therapy, Xochitl has attended a total number of 10 visits and has maintained excellent compliance with established POC. Patient has made gradual improvements in all areas, including decreased pain, increased strength and improved activity tolerance. Patient does continue to present with pain along her medial patellofemoral joint and posteromedial joint  "line. Patient has tenderness along her adductors distally. Patient has expressed interest in referral to ortho and may benefit from diagnostic imaging and/or injection. Patient would continue to benefit from skilled physical therapy to address her aforementioned impairments, improve their level of function and to improve their overall quality of life.  Impairments: abnormal or restricted ROM, activity intolerance, impaired balance, impaired physical strength, lacks appropriate home exercise program, pain with function and weight-bearing intolerance  Other impairment: decreased flexibility  Functional limitations: difficulty with stair negotiationUnderstanding of Dx/Px/POC: good   Prognosis: good    Goals  STGs:  1.  Initiate and complete HEP with verbal cues. MET  2.  Decrease R knee pain by > 25% in 4 weeks. PROGRESSED, BUT NOT MET  3.  Improve R knee ROM by > 5-10 degrees in 4 weeks. MET  4.  Improve B LE strength by 1/2-1 grade in 4 weeks. MET  LTGs: ALL GOALS PROGRESSING  1.  Patient to be I with HEP in 12 weeks.  2. Improve R knee ROM to 0-120 degrees in 12 weeks to improve function.    3. Improve B LE strength to 4+ to 5/5 t/o in 12 weeks to improve function.  4. Decrease B knee pain to < or = to 3-4/10 with activity in 12 weeks to improve function.  5.  Patient to ambulate with normalized gait pattern in 12 weeks.  6.  Stair negotiation is improved to PLOF in 12 weeks.  7.  Recreational performance is improved to PLOF in 12 weeks.  8.  ADL performance is improved to PLOF in 12 weeks.  9.  Improve 5x STS to < 14\" without use of UE in 12 weeks.     Plan  Patient would benefit from: skilled physical therapy  Planned modality interventions: cryotherapy and thermotherapy: hydrocollator packs  Planned therapy interventions: joint mobilization, balance, balance/weight bearing training, manual therapy, neuromuscular re-education, patient education, postural training, self care, strengthening, stretching, therapeutic " "exercise, therapeutic activities, flexibility, functional ROM exercises, gait training and home exercise program  Frequency: 2x week  Duration in weeks: 6  Plan of Care beginning date: 2024  Plan of Care expiration date: 2024  Treatment plan discussed with: patient      Subjective Evaluation    History of Present Illness  Mechanism of injury: Patient reports that since beginning physical therapy her knee pain has lessened. Patient feels like her activity tolerance has improved. Patient does continue to have increased pain with excessive activity. Patient's morning pain has been less. Patient has increased stiffness/pain with she sleeps in a fetal position all night or if she sits too long and then goes to stand. Patient estimates her knee overall level of function to be approximately 75% of her premorbid norm.  Patient Goals  Patient goals for therapy: decreased pain, increased motion, increased strength and return to sport/leisure activities  Patient goal: \"To help get rid of the pain in my knees, to be able to walk like I used too.\"  Pain  Current pain ratin  At best pain ratin  At worst pain ratin  Location: R Knee > L Knee, posteromedial  Quality: dull ache  Alleviating factors: Icy Hot.  Aggravating factors: walking and standing (going from seated to standing position, WBing activities)    Social Support  Steps to enter house: no  Stairs in house: yes   Lives in: multiple-level home  Lives with: spouse    Employment status: not working      Objective     Tenderness   Left Knee   Tenderness in the medial joint line. No tenderness in the lateral joint line and medial patella.     Right Knee   Tenderness in the medial joint line and medial patella. No tenderness in the lateral joint line.     Active Range of Motion   Left Knee   Flexion: WFL  Extension: WFL    Right Knee   Flexion: WFL and with pain  Extension: WFL    Passive Range of Motion   Left Knee   Flexion: WFL  Extension: WFL    Right " "Knee   Flexion: WFL and with pain  Extension: WFL    Mobility   Patellar Mobility:   Left Knee   WFL: medial and lateral.     Right Knee   WFL: medial and lateral    Strength/Myotome Testing     Left Hip   Planes of Motion   Flexion: 5  Abduction: 5    Right Hip   Planes of Motion   Flexion: 5  Abduction: 5    Left Knee   Flexion: 5  Extension: 5    Right Knee   Flexion: 5  Extension: 5    Left Ankle/Foot   Dorsiflexion: 5    Right Ankle/Foot   Dorsiflexion: 5    Ambulation   Weight-Bearing Status   Assistive device used: none    Ambulation: Level Surfaces   Ambulation without assistive device: independent    Observational Gait   Gait: within functional limits     Additional Observational Gait Details  Lacks TKE on R with heel strike.      Functional Assessment      Squat    Left within functional limits and right within functional limits.     Forward Step Up 8\"   Left Leg  Within functional limits.     Right Leg  Within functional limits.     Forward Step Down 8\"   Left Leg  Within functional limits and pain.     Right Leg  Within functional limits.   Neuro Exam:     Functional outcomes   5x sit to stand: 22\" (seconds)      Flowsheet Rows      Flowsheet Row Most Recent Value   PT/OT G-Codes    Current Score 67   Projected Score 63                    POC expires Unit limit Auth Expiration date PT/OT/ST + Visit Limit?   4/11/24 NA NA 12/31/24 BOMN                           Visit/Unit Tracking  AUTH Status:  Date 1/18 1/24 1/26 1/30 2/1 2/6 2/8 2/14 2/15 2/20     N/A - BOMN Used 1 2 3 4 5 6 7 8 9 10      Remaining                      Precautions: DM, HTN, Hypothyroidism   Access Code: [email or web address] - updated on 2/1/24    Manuals 2/6 2/8 2/14 2/15 2/20   B LE - Hams, Calf        R Knee        Reassessment     GR           Neuro Re-Ed         BOSU Lunges        SLS        Tandem Stance    15\"x3 ea b/l     Sidestepping w/ foam.    Foam 3 laps     TKE w/TBand                        Ther Ex        Patient education: " "pathophysiology, differential diagnosis, referral to ortho     GR   NuStep/ bike   ROM and cardio/endurance L5 10 min with UEs L5 10 min with UE  Bike L1 x 5' Nustep L6 7 min Nustep L6 10 min   HR/TR        SLR x 3        LAQ        Marches        QSets        SAQ        SLR        Supine hip flexor str. 3x30\" b/l       Bridges        S/L Hip Abd        Clamshells        Sidestepping with TB at railq        Matrix: knee flexion 30# 2x10 25# 2x10  25# 3x10 25# 3x10 25# 3x10   Matrix: knee extension 20# 2x10 15# 2x 10  15# 2x10  15# 3x10 15# 3x10   Supine h/s str.     NV   Supine vs. Standing adductor str.     NV   Butterfly str.     NV   Ther Activity        Stepups F/ L  b/l LE   F 8\" step 10x b/l  Fwd 8\" step 15x b/l   Lat 8\" step 10x b/l     Stepdowns  Lateral 6\"10x b/l  Lateral 6\"10x b/l      STS        TG squats  L22 3x10 L22 3x 10  L22 3x 10  L22 3x10 L22 3x10   Lunges 2x10 10x  10x                     Gait Training                        Modalities        HP/CP prn                          Access Code: [email or web address]  URL: [email or web address]  Date: 01/18/2024  Prepared by: Zofia    Exercises  - Seated Long Arc Quad  - 1 x daily - 7 x weekly - 1 sets - 10 reps - 3\" hold  - Seated March  - 1 x daily - 7 x weekly - 1 sets - 10 reps  - Standing Heel Raise with Support  - 1 x daily - 7 x weekly - 1 sets - 10 reps  - Standing Hip Abduction  - 1 x daily - 7 x weekly - 1 sets - 10 reps  - Standing Hip Extension with Chair  - 1 x daily - 7 x weekly - 1 sets - 10 reps                  "

## 2024-02-20 NOTE — PROGRESS NOTES
PT Re-Evaluation     Today's date: 2024  Patient name: Xochitl Madrigal  : 1950  MRN: [id number]  Referring provider: Siegler, Eugenia L, MD  Dx:   Encounter Diagnosis     ICD-10-CM    1. Pain in both knees, unspecified chronicity  M25.561     M25.562       2. Impaired gait and mobility  R26.89           Start Time: 1050  Stop Time: 1150  Total time in clinic (min): 60 minutes    Assessment  Assessment details: Since beginning physical therapy, Xochitl has attended a total number of 10 visits and has maintained excellent compliance with established POC. Patient has made gradual improvements in all areas, including decreased pain, increased strength and improved activity tolerance. Patient does continue to present with pain along her medial patellofemoral joint and posteromedial joint line. Patient has tenderness along her adductors distally. Patient has expressed interest in referral to ortho and may benefit from diagnostic imaging and/or injection. Patient would continue to benefit from skilled physical therapy to address her aforementioned impairments, improve their level of function and to improve their overall quality of life.  Impairments: abnormal or restricted ROM, activity intolerance, impaired balance, impaired physical strength, lacks appropriate home exercise program, pain with function and weight-bearing intolerance  Other impairment: decreased flexibility  Functional limitations: difficulty with stair negotiationUnderstanding of Dx/Px/POC: good   Prognosis: good    Goals  STGs:  1.  Initiate and complete HEP with verbal cues. MET  2.  Decrease R knee pain by > 25% in 4 weeks. PROGRESSED, BUT NOT MET  3.  Improve R knee ROM by > 5-10 degrees in 4 weeks. MET  4.  Improve B LE strength by 1/2-1 grade in 4 weeks. MET  LTGs: ALL GOALS PROGRESSING  1.  Patient to be I with HEP in 12 weeks.  2. Improve R knee ROM to 0-120 degrees in 12 weeks to improve function.    3. Improve B LE strength to  "4+ to 5/5 t/o in 12 weeks to improve function.  4. Decrease B knee pain to < or = to 3-4/10 with activity in 12 weeks to improve function.  5.  Patient to ambulate with normalized gait pattern in 12 weeks.  6.  Stair negotiation is improved to PLOF in 12 weeks.  7.  Recreational performance is improved to PLOF in 12 weeks.  8.  ADL performance is improved to PLOF in 12 weeks.  9.  Improve 5x STS to < 14\" without use of UE in 12 weeks.     Plan  Patient would benefit from: skilled physical therapy  Planned modality interventions: cryotherapy and thermotherapy: hydrocollator packs  Planned therapy interventions: joint mobilization, balance, balance/weight bearing training, manual therapy, neuromuscular re-education, patient education, postural training, self care, strengthening, stretching, therapeutic exercise, therapeutic activities, flexibility, functional ROM exercises, gait training and home exercise program  Frequency: 2x week  Duration in weeks: 6  Plan of Care beginning date: 2024  Plan of Care expiration date: 2024  Treatment plan discussed with: patient      Subjective Evaluation    History of Present Illness  Mechanism of injury: Patient reports that since beginning physical therapy her knee pain has lessened. Patient feels like her activity tolerance has improved. Patient does continue to have increased pain with excessive activity. Patient's morning pain has been less. Patient has increased stiffness/pain with she sleeps in a fetal position all night or if she sits too long and then goes to stand. Patient estimates her knee overall level of function to be approximately 75% of her premorbid norm.  Patient Goals  Patient goals for therapy: decreased pain, increased motion, increased strength and return to sport/leisure activities  Patient goal: \"To help get rid of the pain in my knees, to be able to walk like I used too.\"  Pain  Current pain ratin  At best pain ratin  At worst pain rating: " "7  Location: R Knee > L Knee, posteromedial  Quality: dull ache  Alleviating factors: Icy Hot.  Aggravating factors: walking and standing (going from seated to standing position, WBing activities)    Social Support  Steps to enter house: no  Stairs in house: yes   Lives in: multiple-level home  Lives with: spouse    Employment status: not working      Objective     Tenderness   Left Knee   Tenderness in the medial joint line. No tenderness in the lateral joint line and medial patella.     Right Knee   Tenderness in the medial joint line and medial patella. No tenderness in the lateral joint line.     Active Range of Motion   Left Knee   Flexion: WFL  Extension: WFL    Right Knee   Flexion: WFL and with pain  Extension: WFL    Passive Range of Motion   Left Knee   Flexion: WFL  Extension: WFL    Right Knee   Flexion: WFL and with pain  Extension: WFL    Mobility   Patellar Mobility:   Left Knee   WFL: medial and lateral.     Right Knee   WFL: medial and lateral    Strength/Myotome Testing     Left Hip   Planes of Motion   Flexion: 5  Abduction: 5    Right Hip   Planes of Motion   Flexion: 5  Abduction: 5    Left Knee   Flexion: 5  Extension: 5    Right Knee   Flexion: 5  Extension: 5    Left Ankle/Foot   Dorsiflexion: 5    Right Ankle/Foot   Dorsiflexion: 5    Ambulation   Weight-Bearing Status   Assistive device used: none    Ambulation: Level Surfaces   Ambulation without assistive device: independent    Observational Gait   Gait: within functional limits     Additional Observational Gait Details  Lacks TKE on R with heel strike.      Functional Assessment      Squat    Left within functional limits and right within functional limits.     Forward Step Up 8\"   Left Leg  Within functional limits.     Right Leg  Within functional limits.     Forward Step Down 8\"   Left Leg  Within functional limits and pain.     Right Leg  Within functional limits.   Neuro Exam:     Functional outcomes   5x sit to stand: 22\" " "(seconds)      Flowsheet Rows      Flowsheet Row Most Recent Value   PT/OT G-Codes    Current Score 67   Projected Score 63                    POC expires Unit limit Auth Expiration date PT/OT/ST + Visit Limit?   4/11/24 NA NA 12/31/24 BOMN                           Visit/Unit Tracking  AUTH Status:  Date 1/18 1/24 1/26 1/30 2/1 2/6 2/8 2/14 2/15 2/20     N/A - BOMN Used 1 2 3 4 5 6 7 8 9 10      Remaining                      Precautions: DM, HTN, Hypothyroidism   Access Code: [email or web address] - updated on 2/1/24    Manuals 2/6 2/8 2/14 2/15 2/20   B LE - Hams, Calf        R Knee        Reassessment     GR           Neuro Re-Ed         [facility] Lunges        SLS        Tandem Stance    15\"x3 ea b/l     Sidestepping w/ foam.    Foam 3 laps     TKE w/TBand                        Ther Ex        Patient education: pathophysiology, differential diagnosis, referral to ortho     GR   NuStep/ bike   ROM and cardio/endurance L5 10 min with UEs L5 10 min with UE  Bike L1 x 5' Nustep L6 7 min Nustep L6 10 min   HR/TR        SLR x 3        LAQ        Marches        QSets        SAQ        SLR        Supine hip flexor str. 3x30\" b/l       Bridges        S/L Hip Abd        Clamshells        Sidestepping with TB at railq        Matrix: knee flexion 30# 2x10 25# 2x10  25# 3x10 25# 3x10 25# 3x10   Matrix: knee extension 20# 2x10 15# 2x 10  15# 2x10  15# 3x10 15# 3x10   Supine h/s str.     NV   Supine vs. Standing adductor str.     NV   Butterfly str.     NV   Ther Activity        Stepups F/ L  b/l LE   F 8\" step 10x b/l  Fwd 8\" step 15x b/l   Lat 8\" step 10x b/l     Stepdowns  Lateral 6\"10x b/l  Lateral 6\"10x b/l      STS        TG squats  L22 3x10 L22 3x 10  L22 3x 10  L22 3x10 L22 3x10   Lunges 2x10 10x  10x                     Gait Training                        Modalities        HP/CP prn                          Access Code: [email or web address]  URL: [email or web address]  Date: 01/18/2024  Prepared by: Zofia Dunne  - Seated " "Long Arc Quad  - 1 x daily - 7 x weekly - 1 sets - 10 reps - 3\" hold  - Seated March  - 1 x daily - 7 x weekly - 1 sets - 10 reps  - Standing Heel Raise with Support  - 1 x daily - 7 x weekly - 1 sets - 10 reps  - Standing Hip Abduction  - 1 x daily - 7 x weekly - 1 sets - 10 reps  - Standing Hip Extension with Chair  - 1 x daily - 7 x weekly - 1 sets - 10 reps  "

## 2024-02-22 ENCOUNTER — OFFICE VISIT (OUTPATIENT)
Dept: PHYSICAL THERAPY | Facility: CLINIC | Age: 74
End: 2024-02-22
Payer: MEDICARE

## 2024-02-22 DIAGNOSIS — M25.561 PAIN IN BOTH KNEES, UNSPECIFIED CHRONICITY: Primary | ICD-10-CM

## 2024-02-22 DIAGNOSIS — M25.562 PAIN IN BOTH KNEES, UNSPECIFIED CHRONICITY: Primary | ICD-10-CM

## 2024-02-22 DIAGNOSIS — R26.89 IMPAIRED GAIT AND MOBILITY: ICD-10-CM

## 2024-02-22 PROCEDURE — 97110 THERAPEUTIC EXERCISES: CPT | Performed by: PHYSICAL THERAPIST

## 2024-02-22 NOTE — PROGRESS NOTES
"Daily Note     Today's date: 2024  Patient name: Xochitl Madrigal  : 1950  MRN: [id number]  Referring provider: Siegler, Eugenia L, MD  Dx:   Encounter Diagnosis     ICD-10-CM    1. Pain in both knees, unspecified chronicity  M25.561     M25.562       2. Impaired gait and mobility  R26.89           Start Time: 1115  Stop Time: 1210  Total time in clinic (min): 55 minutes    Subjective: Patient offers no new complaints, states that the front of her knee is currently sore.      Objective: See treatment diary below      Assessment: Tolerated treatment well. Patient demonstrated fatigue post treatment and would benefit from continued PT. Patient with limited hip adductor flexibility with limited ROM.      Plan: Continue per plan of care.  Progress treatment as tolerated.            POC expires Unit limit Auth Expiration date PT/OT/ST + Visit Limit?   24 NA NA 24 BOMN                           Visit/Unit Tracking  AUTH Status:  Date 1/18 1/24 1/26 1/30 2/1 2/6 2/8 2/14 2/15 2/20 2/22    N/A - BOMN Used 1 2 3 4 5 6 7 8 9 10 11     Remaining                      Precautions: DM, HTN, Hypothyroidism   Access Code: [email or web address] - updated on 24    Manuals 2/22  2/14 2/15 2/20   B LE - Hams, Calf        R Knee        Reassessment     GR           Neuro Re-Ed         [facility] Lunges        SLS        Tandem Stance    15\"x3 ea b/l     Sidestepping w/ foam.    Foam 3 laps     TKE w/TBand                        Ther Ex        Patient education: pathophysiology, differential diagnosis, referral to ortho     GR   NuStep/ bike   ROM and cardio/endurance Nustep 10 min  Bike L1 x 5' Nustep L6 7 min Nustep L6 10 min   HR/TR        SLR x 3        LAQ        Marches        QSets        SAQ        SLR        Supine hip flexor str. 3x30\" b/l       Bridges        S/L Hip Abd        Clamshells        Sidestepping with TB at railq RTB 7 laps       Matrix: knee flexion 35# 2x10  25# 3x10 25# 3x10 25# 3x10   Matrix: knee " "extension 25# 2x10  15# 2x10  15# 3x10 15# 3x10   Supine h/s str. 3x30\" b/l    NV   Supine vs. Standing adductor str. Supine 3x30\" b/l    NV   Butterfly str. Unable    NV   Ther Activity        Stepups F/ L  b/l LE    Fwd 8\" step 15x b/l   Lat 8\" step 10x b/l     Stepdowns   Lateral 6\"10x b/l      STS        TG squats    L22 3x 10  L22 3x10 L22 3x10   Lunges   10x                     Gait Training                        Modalities        HP/CP prn                            "

## 2024-02-27 ENCOUNTER — OFFICE VISIT (OUTPATIENT)
Dept: PHYSICAL THERAPY | Facility: CLINIC | Age: 74
End: 2024-02-27
Payer: MEDICARE

## 2024-02-27 DIAGNOSIS — M25.562 PAIN IN BOTH KNEES, UNSPECIFIED CHRONICITY: Primary | ICD-10-CM

## 2024-02-27 DIAGNOSIS — M25.561 PAIN IN BOTH KNEES, UNSPECIFIED CHRONICITY: Primary | ICD-10-CM

## 2024-02-27 DIAGNOSIS — R26.89 IMPAIRED GAIT AND MOBILITY: ICD-10-CM

## 2024-02-27 PROCEDURE — 97110 THERAPEUTIC EXERCISES: CPT | Performed by: PHYSICAL THERAPIST

## 2024-02-27 NOTE — PROGRESS NOTES
"Daily Note     Today's date: 2024  Patient name: Xochitl Madrigal  : 1950  MRN: [id number]  Referring provider: Siegler, Eugenia L, MD  Dx:   Encounter Diagnosis     ICD-10-CM    1. Pain in both knees, unspecified chronicity  M25.561     M25.562       2. Impaired gait and mobility  R26.89           Start Time: 0930  Stop Time: 1020  Total time in clinic (min): 50 minutes    Subjective: Patient reports that she contacted ortho and has an initial consultation on 3/13.       Objective: See treatment diary below      Assessment: Tolerated treatment well. Patient demonstrated fatigue post treatment and would benefit from continued PT. No significant changes to overall status. Patient reports feeling slightly better following stretches.      Plan: Continue per plan of care.  Progress treatment as tolerated.       POC expires Unit limit Auth Expiration date PT/OT/ST + Visit Limit?   24 NA NA 24 BOMN                           Visit/Unit Tracking  AUTH Status:  Date 1/18 1/24 1/26 1/30 2/1 2/6 2/8 2/14 2/15 2/20 2/22 2/27   N/A - BOMN Used 1 2 3 4 5 6 7 8 9 10 11 12    Remaining                      Precautions: DM, HTN, Hypothyroidism   Access Code: [email or web address] - updated on 24    Manuals 2/22 2/27  2/15 2/20   B LE - Hams, Calf        R Knee        Reassessment     GR           Neuro Re-Ed         [facility] Lunges        SLS        Tandem Stance         Sidestepping w/ foam.         TKE w/TBand                        Ther Ex        Patient education: pathophysiology, differential diagnosis, referral to ortho     GR   NuStep/ bike   ROM and cardio/endurance Nustep 10 min Nustep 10 min  Nustep L6 7 min Nustep L6 10 min   HR/TR        SLR x 3        LAQ        Marches        QSets        SAQ        SLR        Supine hip flexor str. 3x30\" b/l 3x30\" b/l      Bridges        S/L Hip Abd        Clamshells        Sidestepping with TB at railq RTB 7 laps       Matrix: knee flexion 35# 2x10 35# 3x10  25# 3x10 " "25# 3x10   Matrix: knee extension 25# 2x10 25# 3x10  15# 3x10 15# 3x10   Supine h/s str. 3x30\" b/l 3x30\" b/l   NV   Supine vs. Standing adductor str. Supine 3x30\" b/l Supine 3x30\" b/l   NV   Butterfly str. Unable D/C   NV   Ther Activity        Stepups F/ L  b/l LE         Stepdowns        STS        TG squats   L22 3x10  L22 3x10 L22 3x10   Lunges                        Gait Training                        Modalities        HP/CP prn                              "

## 2024-02-29 ENCOUNTER — OFFICE VISIT (OUTPATIENT)
Dept: PHYSICAL THERAPY | Facility: CLINIC | Age: 74
End: 2024-02-29
Payer: MEDICARE

## 2024-02-29 DIAGNOSIS — M25.561 PAIN IN BOTH KNEES, UNSPECIFIED CHRONICITY: Primary | ICD-10-CM

## 2024-02-29 DIAGNOSIS — M25.562 PAIN IN BOTH KNEES, UNSPECIFIED CHRONICITY: Primary | ICD-10-CM

## 2024-02-29 DIAGNOSIS — R26.89 IMPAIRED GAIT AND MOBILITY: ICD-10-CM

## 2024-02-29 PROCEDURE — 97110 THERAPEUTIC EXERCISES: CPT

## 2024-02-29 PROCEDURE — 97530 THERAPEUTIC ACTIVITIES: CPT

## 2024-02-29 NOTE — PROGRESS NOTES
"Daily Note     Today's date: 2024  Patient name: Xochitl Madrigal  : 1950  MRN: [id number]  Referring provider: Siegler, Eugenia L, MD  Dx:   Encounter Diagnosis     ICD-10-CM    1. Pain in both knees, unspecified chronicity  M25.561     M25.562       2. Impaired gait and mobility  R26.89           Start Time: 1440  Stop Time: 1525  Total time in clinic (min): 45 minutes    Subjective: intermittent pain in the R posterior knee but the soreness is constant only in R knee. R>L.       Objective: See treatment diary below      Assessment:  Continued with treatment session with focus on  b/l LE strengthening. Tolerated treatment well. Pt noted no immediate changes in pain status.  Patient exhibited good technique with therapeutic exercises and would benefit from continued PT      Plan: Continue per plan of care.      POC expires Unit limit Auth Expiration date PT/OT/ST + Visit Limit?   24 NA NA 24 BOMN                           Visit/Unit Tracking  AUTH Status:  Date  2 2/8 2/14 2/15 2/20 2/22 2/27   N/A - BOMN Used 13  3 4 5 6 7 8 9 10 11 12    Remaining                      Precautions: DM, HTN, Hypothyroidism   Access Code: [email or web address] - updated on 24    Manuals    B LE - Hams, Calf        R Knee        Reassessment     GR           Neuro Re-Ed         [facility] Lunges        SLS        Tandem Stance         Sidestepping w/ foam.         TKE w/TBand                        Ther Ex        Patient education: pathophysiology, differential diagnosis, referral to ortho     GR   NuStep/ bike   ROM and cardio/endurance Nustep 10 min Nustep 10 min Nustep 10 min   Nustep L6 10 min   HR/TR        SLR x 3        LAQ        Marches        QSets        SAQ        SLR        Supine hip flexor str. 3x30\" b/l 3x30\" b/l 3x 30b/l      Bridges        S/L Hip Abd        Clamshells        Sidestepping with TB at railq RTB 7 laps       Matrix: knee flexion 35# 2x10 35# 3x10 35# " "3x 10   25# 3x10   Matrix: knee extension 25# 2x10 25# 3x10 20# 3x 10   15# 3x10   Supine h/s str. 3x30\" b/l 3x30\" b/l 3x 30\"b/l   NV   Supine vs. Standing adductor str. Supine 3x30\" b/l Supine 3x30\" b/l Supine 3x 30\" b/l   NV   Butterfly str. Unable D/C   NV                   Ther Activity        Stepups F/ L  b/l LE         Stepdowns   4\" 10x      STS        TG squats   L22 3x10 L22 3x 10   L22 3x10   Lunges                        Gait Training                        Modalities        HP/CP prn                                "

## 2024-03-05 ENCOUNTER — OFFICE VISIT (OUTPATIENT)
Dept: PHYSICAL THERAPY | Facility: CLINIC | Age: 74
End: 2024-03-05
Payer: MEDICARE

## 2024-03-05 DIAGNOSIS — R26.89 IMPAIRED GAIT AND MOBILITY: ICD-10-CM

## 2024-03-05 DIAGNOSIS — M25.562 PAIN IN BOTH KNEES, UNSPECIFIED CHRONICITY: Primary | ICD-10-CM

## 2024-03-05 DIAGNOSIS — M25.561 PAIN IN BOTH KNEES, UNSPECIFIED CHRONICITY: Primary | ICD-10-CM

## 2024-03-05 PROCEDURE — 97530 THERAPEUTIC ACTIVITIES: CPT | Performed by: PHYSICAL THERAPIST

## 2024-03-05 PROCEDURE — 97110 THERAPEUTIC EXERCISES: CPT | Performed by: PHYSICAL THERAPIST

## 2024-03-05 NOTE — PROGRESS NOTES
"Daily Note     Today's date: 3/5/2024  Patient name: Xochitl Madrigal  : 1950  MRN: [id number]  Referring provider: Siegler, Eugenia L, MD  Dx:   Encounter Diagnosis     ICD-10-CM    1. Pain in both knees, unspecified chronicity  M25.561     M25.562       2. Impaired gait and mobility  R26.89           Start Time: 1000  Stop Time: 1055  Total time in clinic (min): 55 minutes    Subjective: Patient reports that she hasn't had as much pain in her legs laying down since she's been stretching.      Objective: See treatment diary below      Assessment: Tolerated treatment well. Patient demonstrated fatigue post treatment and would benefit from continued PT. Patient demonstrated good technique and eccentric control performing sit to stands with weight as well as descending 6\" step.      Plan: Continue per plan of care.  Progress treatment as tolerated.       POC expires Unit limit Auth Expiration date PT/OT/ST + Visit Limit?   24 NA NA 24 BOMN                           Visit/Unit Tracking  AUTH Status:  Date 2/29 3/5  1/30 2/1 2/6 2/8 2/14 2/15 2/20 2/22 2/27   N/A - BOMN Used 13 14  4 5 6 7 8 9 10 11 12    Remaining                      Precautions: DM, HTN, Hypothyroidism   Access Code: [email or web address] - updated on 24    Manuals 2/22 2/27 2/29 3/5    B LE - Hams, Calf        R Knee        Reassessment                Neuro Re-Ed         [facility] Lunges        SLS        Tandem Stance         Sidestepping w/ foam.         TKE w/TBand                        Ther Ex        Patient education: pathophysiology, differential diagnosis, referral to ortho        NuStep/ bike   ROM and cardio/endurance Nustep 10 min Nustep 10 min Nustep 10 min  Nustep 10 min    HR/TR        SLR x 3        LAQ        Marches        QSets        SAQ        SLR        Supine hip flexor str. 3x30\" b/l 3x30\" b/l 3x 30b/l  3x30\" b/l    Bridges        S/L Hip Abd        Clamshells        Sidestepping with TB at railq RTB 7 laps     " "  Matrix: knee flexion 35# 2x10 35# 3x10 35# 3x 10  35# 3x10    Matrix: knee extension 25# 2x10 25# 3x10 20# 3x 10  25# 3x10    Supine h/s str. 3x30\" b/l 3x30\" b/l 3x 30\"b/l  3x30\" b/l    Supine vs. Standing adductor str. Supine 3x30\" b/l Supine 3x30\" b/l Supine 3x 30\" b/l  Supine 3x30 b/l    Butterfly str. Unable D/C                      Ther Activity        Stepups F/ L  b/l LE         Stepdowns   4\" 10x      STS    15# KB 2x10    TG squats   L22 3x10 L22 3x 10  L22 3x10    Lunges        LSD    6\" 2x10 b/l            Gait Training                        Modalities        HP/CP prn                        "

## 2024-03-07 ENCOUNTER — OFFICE VISIT (OUTPATIENT)
Dept: PHYSICAL THERAPY | Facility: CLINIC | Age: 74
End: 2024-03-07
Payer: MEDICARE

## 2024-03-07 DIAGNOSIS — M25.562 PAIN IN BOTH KNEES, UNSPECIFIED CHRONICITY: Primary | ICD-10-CM

## 2024-03-07 DIAGNOSIS — M25.561 PAIN IN BOTH KNEES, UNSPECIFIED CHRONICITY: Primary | ICD-10-CM

## 2024-03-07 DIAGNOSIS — R26.89 IMPAIRED GAIT AND MOBILITY: ICD-10-CM

## 2024-03-07 PROCEDURE — 97110 THERAPEUTIC EXERCISES: CPT

## 2024-03-07 PROCEDURE — 97530 THERAPEUTIC ACTIVITIES: CPT

## 2024-03-07 NOTE — PROGRESS NOTES
"Daily Note     Today's date: 3/7/2024  Patient name: Xochitl Madrigal  : 1950  MRN: [id number]  Referring provider: Siegler, Eugenia L, MD  Dx:   Encounter Diagnosis     ICD-10-CM    1. Pain in both knees, unspecified chronicity  M25.561     M25.562       2. Impaired gait and mobility  R26.89           Start Time: 1126  Stop Time: 1215  Total time in clinic (min): 49 minutes    Subjective: pt noted that she still does have occasional pain in her knees but no new changes since last treatment session.       Objective: See treatment diary below      Assessment: Tolerated treatment well. Patient exhibited good technique with therapeutic exercises and would benefit from continued PT  Pt was able to complete all exercises with no increase in pain notes t/o exercises completed.     Plan: Continue per plan of care.      POC expires Unit limit Auth Expiration date PT/OT/ST + Visit Limit?   24 NA NA 24 BOMN                           Visit/Unit Tracking  AUTH Status:  Date 2/29 3/5 3/7  2/1 2/6 2/8 2/14 2/15 2/20 2/22 2/27   N/A - BOMN Used 13 14 15  5 6 7 8 9 10 11 12    Remaining                      Precautions: DM, HTN, Hypothyroidism   Access Code: [email or web address] - updated on 24    Manuals 2/22 2/27 2/29 3/5 3/7   B LE - Hams, Calf        R Knee        Reassessment                Neuro Re-Ed         [facility] Lunges        SLS        Tandem Stance         Sidestepping w/ foam.         TKE w/TBand                        Ther Ex        Patient education: pathophysiology, differential diagnosis, referral to ortho        NuStep/ bike   ROM and cardio/endurance Nustep 10 min Nustep 10 min Nustep 10 min  Nustep 10 min Nustep 10 min    HR/TR        SLR x 3        LAQ        Marches        QSets        SAQ        SLR        Supine hip flexor str. 3x30\" b/l 3x30\" b/l 3x 30b/l  3x30\" b/l 3x 30\" b/l    Bridges        S/L Hip Abd        Clamshells        Sidestepping with TB at railq RTB 7 laps       Matrix: knee " "flexion 35# 2x10 35# 3x10 35# 3x 10  35# 3x10 35# 3x 10    Matrix: knee extension 25# 2x10 25# 3x10 20# 3x 10  25# 3x10 25# 3x 10    Supine h/s str. 3x30\" b/l 3x30\" b/l 3x 30\"b/l  3x30\" b/l Supine 3x 30\"    Supine vs. Standing adductor str. Supine 3x30\" b/l Supine 3x30\" b/l Supine 3x 30\" b/l  Supine 3x30 b/l Supine 3x30 b/l   Butterfly str. Unable D/C                      Ther Activity        Stepups F/ L  b/l LE         Stepdowns   4\" 10x      STS    15# KB 2x10 15#  2x 10    TG squats   L22 3x10 L22 3x 10  L22 3x10 L22 3x 10    Lunges        LSD    6\" 2x10 b/l 6\" 3x 10            Gait Training                        Modalities        HP/CP prn                          "

## 2024-03-12 ENCOUNTER — OFFICE VISIT (OUTPATIENT)
Dept: PHYSICAL THERAPY | Facility: CLINIC | Age: 74
End: 2024-03-12
Payer: MEDICARE

## 2024-03-12 DIAGNOSIS — R26.89 IMPAIRED GAIT AND MOBILITY: ICD-10-CM

## 2024-03-12 DIAGNOSIS — M25.562 PAIN IN BOTH KNEES, UNSPECIFIED CHRONICITY: Primary | ICD-10-CM

## 2024-03-12 DIAGNOSIS — M25.561 PAIN IN BOTH KNEES, UNSPECIFIED CHRONICITY: Primary | ICD-10-CM

## 2024-03-12 PROCEDURE — 97530 THERAPEUTIC ACTIVITIES: CPT | Performed by: PHYSICAL THERAPIST

## 2024-03-12 PROCEDURE — 97110 THERAPEUTIC EXERCISES: CPT | Performed by: PHYSICAL THERAPIST

## 2024-03-12 NOTE — PROGRESS NOTES
"Daily Note     Today's date: 3/12/2024  Patient name: Xochitl Madrigal  : 1950  MRN: [id number]  Referring provider: Siegler, Eugenia L, MD  Dx:   Encounter Diagnosis     ICD-10-CM    1. Pain in both knees, unspecified chronicity  M25.561     M25.562       2. Impaired gait and mobility  R26.89           Start Time: 1001  Stop Time: 1045  Total time in clinic (min): 44 minutes    Subjective: Patient reports that overall her legs feel a little better. Patient is scheduled for an initial consult with ortho tomorrow.      Objective: See treatment diary below      Assessment: Tolerated treatment well. Patient demonstrated fatigue post treatment and would benefit from continued PT. No significant changes to overall status since last visit.      Plan: Continue per plan of care.  Progress treatment as tolerated.       POC expires Unit limit Auth Expiration date PT/OT/ST + Visit Limit?   24 NA NA 24 BOMN                           Visit/Unit Tracking  AUTH Status:  Date 2/29 3/5 3/7 3/12  2/6 2/8 2/14 2/15 2/20 2/22 2/27   N/A - BOMN Used 13 14 15 16  6 7 8 9 10 11 12    Remaining                      Precautions: DM, HTN, Hypothyroidism   Access Code: [email or web address] - updated on 24    Manuals 3/12  2/29 3/5 3/7   B LE - Hams, Calf        R Knee        Reassessment                Neuro Re-Ed         [facility] Lunges        SLS        Tandem Stance         Sidestepping w/ foam.         TKE w/TBand                        Ther Ex        Patient education: pathophysiology, differential diagnosis, referral to ortho        NuStep/ bike   ROM and cardio/endurance Nustep 10 min L6  Nustep 10 min  Nustep 10 min Nustep 10 min    HR/TR        SLR x 3        LAQ        Marches        QSets        SAQ        SLR        Supine hip flexor str.   3x 30b/l  3x30\" b/l 3x 30\" b/l    Bridges        S/L Hip Abd        Clamshells        Sidestepping with TB at rail        Matrix: knee flexion 35# 3x10  35# 3x 10  35# 3x10 35# 3x 10  " "  Matrix: knee extension 25# 3x10  20# 3x 10  25# 3x10 25# 3x 10    Supine h/s str.   3x 30\"b/l  3x30\" b/l Supine 3x 30\"    Supine vs. Standing adductor str.   Supine 3x 30\" b/l  Supine 3x30 b/l Supine 3x30 b/l   Butterfly str.                        Ther Activity        Stepups F/ L  b/l LE         Stepdowns   4\" 10x      STS 15# 2x10   15# KB 2x10 15#  2x 10    TG squats  L22 3x10  L22 3x 10  L22 3x10 L22 3x 10    Lunges 2x10 b/l       LSD    6\" 2x10 b/l 6\" 3x 10            Gait Training                        Modalities        HP/CP prn                            "

## 2024-03-13 ENCOUNTER — APPOINTMENT (OUTPATIENT)
Dept: RADIOLOGY | Facility: CLINIC | Age: 74
End: 2024-03-13
Payer: MEDICARE

## 2024-03-13 ENCOUNTER — OFFICE VISIT (OUTPATIENT)
Dept: OBGYN CLINIC | Facility: CLINIC | Age: 74
End: 2024-03-13
Payer: MEDICARE

## 2024-03-13 VITALS
DIASTOLIC BLOOD PRESSURE: 86 MMHG | HEIGHT: 66 IN | HEART RATE: 98 BPM | SYSTOLIC BLOOD PRESSURE: 130 MMHG | WEIGHT: 200 LBS | BODY MASS INDEX: 32.14 KG/M2

## 2024-03-13 DIAGNOSIS — M25.561 RIGHT KNEE PAIN, UNSPECIFIED CHRONICITY: ICD-10-CM

## 2024-03-13 DIAGNOSIS — M17.0 PRIMARY OSTEOARTHRITIS OF BOTH KNEES: Primary | ICD-10-CM

## 2024-03-13 DIAGNOSIS — M22.2X2 PATELLOFEMORAL SYNDROME OF BOTH KNEES: ICD-10-CM

## 2024-03-13 DIAGNOSIS — M22.2X1 PATELLOFEMORAL SYNDROME OF BOTH KNEES: ICD-10-CM

## 2024-03-13 DIAGNOSIS — M25.562 LEFT KNEE PAIN, UNSPECIFIED CHRONICITY: ICD-10-CM

## 2024-03-13 PROCEDURE — 73564 X-RAY EXAM KNEE 4 OR MORE: CPT

## 2024-03-13 PROCEDURE — 99204 OFFICE O/P NEW MOD 45 MIN: CPT | Performed by: FAMILY MEDICINE

## 2024-03-13 RX ORDER — LINAGLIPTIN 5 MG/1
5 TABLET, FILM COATED ORAL DAILY
COMMUNITY

## 2024-03-13 RX ORDER — AMLODIPINE BESYLATE 10 MG/1
10 TABLET ORAL DAILY
COMMUNITY
Start: 2022-04-04

## 2024-03-13 RX ORDER — CHLORTHALIDONE 25 MG/1
25 TABLET ORAL DAILY
COMMUNITY
Start: 2022-02-24

## 2024-03-13 RX ORDER — METOPROLOL SUCCINATE 25 MG/1
TABLET, EXTENDED RELEASE ORAL
COMMUNITY

## 2024-03-13 RX ORDER — HYDRALAZINE HYDROCHLORIDE 25 MG/1
TABLET, FILM COATED ORAL
COMMUNITY

## 2024-03-13 RX ORDER — LATANOPROST 50 UG/ML
SOLUTION/ DROPS OPHTHALMIC
COMMUNITY

## 2024-03-13 RX ORDER — REPAGLINIDE 1 MG/1
TABLET ORAL
COMMUNITY
Start: 2024-02-26

## 2024-03-13 RX ORDER — LEVOTHYROXINE SODIUM 0.1 MG/1
100 TABLET ORAL DAILY
COMMUNITY
Start: 2022-03-11

## 2024-03-13 RX ORDER — ASPIRIN 81 MG/1
81 TABLET, CHEWABLE ORAL
COMMUNITY

## 2024-03-13 RX ORDER — DAPAGLIFLOZIN 10 MG/1
TABLET, FILM COATED ORAL
COMMUNITY

## 2024-03-13 RX ORDER — LOSARTAN POTASSIUM 25 MG/1
25 TABLET ORAL DAILY
COMMUNITY

## 2024-03-13 RX ORDER — SPIRONOLACTONE 25 MG/1
TABLET ORAL
COMMUNITY
Start: 2022-04-04

## 2024-03-13 RX ORDER — ATORVASTATIN CALCIUM 20 MG/1
TABLET, FILM COATED ORAL
COMMUNITY
Start: 2022-03-10

## 2024-03-13 NOTE — PATIENT INSTRUCTIONS
Over the counter vitamins:    - turmeric vitamin at least 1000 mg daily    - tart cherry vitamin at least 1000 mg daily    - glucosamine-chondrointin 2-3 times daily    Rx: Diclofenac gel/voltaren  - 3 times daily for 10 days

## 2024-03-13 NOTE — PROGRESS NOTES
Assessment/Plan:  Assessment/Plan   Diagnoses and all orders for this visit:    Primary osteoarthritis of both knees  -     XR knee 4+ vw left injury; Future  -     XR knee 4+ vw right injury; Future  -     Diclofenac Sodium (VOLTAREN) 1 %; Apply 2 g topically 3 (three) times a day    Patellofemoral syndrome of both knees    Other orders  -     amLODIPine (NORVASC) 10 mg tablet; Take 10 mg by mouth daily  -     aspirin 81 mg chewable tablet; Chew 81 mg  -     atorvastatin (LIPITOR) 20 mg tablet; TAKE 1 TABLET BY MOUTH AT BEDTIME FOR HIGH AMOUNT OF FATS IN THE BLOOD  -     chlorthalidone 25 mg tablet; Take 25 mg by mouth daily  -     glucose blood test strip; FOR ONCE DAILY BG TESTING, DX E11.9 TYPE 2 DM INDICATIONS: TYPE 2 DM  -     Farxiga 10 MG tablet; TAKE 1 TABLET (10 MG) BY MOUTH DAILY.  -     hydrALAZINE (APRESOLINE) 25 mg tablet; TAKE 2 TABLETS BY MOUTH EVERY MORNING AND TAKE 1 TABLET EVERY EVENING  -     levothyroxine 100 mcg tablet; Take 100 mcg by mouth daily  -     Tradjenta 5 MG TABS; Take 5 mg by mouth daily  -     losartan (COZAAR) 25 mg tablet; Take 25 mg by mouth daily  -     latanoprost (XALATAN) 0.005 % ophthalmic solution; instill 1 drop into both eyes at bedtime  -     metFORMIN (GLUCOPHAGE) 1000 MG tablet; TAKE 1 TABLET BY MOUTH 2 TIMES A DAY WITH MEALS. INDICATIONS: DIABETES  -     metoprolol succinate (TOPROL-XL) 25 mg 24 hr tablet; TAKE 1 TABLET BY MOUTH EVERY DAY FOR HIGH BLOOD PRESSURE  -     spironolactone (ALDACTONE) 25 mg tablet  -     repaglinide (PRANDIN) 1 mg tablet      74-year-old female with pain both knees more than 3 months duration.  Discussed the patient physical exam, radiographs, impression, and plan.  X-rays both knees noted for mild degenerative changes of the medial tibiofemoral space with more pronounced degenerative changes of the patellofemoral spaces.  Physical exam noted for mild swelling both knees.  She has tenderness medial joint line both knees.  She has full  extension and flexion to 120 degrees both knees.  There is no appreciable collateral ligament laxity.  There is no groin pain with FLAVIO and FADDIR of the hips.  Clinical impression is that she has symptoms from combination of aggravated degenerative changes and abnormal patellofemoral mechanics.  I advised patient that surgery is not warranted.  She has already been attending formal therapy so I discussed treatment options of supplements, topical anti-inflammatory, and injection.  She declines injection at this time.  She is to take turmeric vitamin at least 1000 mg daily, tart cherry vitamin at least 1000 mg daily, and glucosamine 2-3 times daily.  She is to apply topical diclofenac gel 3 times daily for the next 10 days.  She will follow-up as needed.            Subjective:   Patient ID: Xochitl Madrigal is a 74 y.o. female.  Chief Complaint   Patient presents with    Left Knee - Pain    Right Knee - Pain      74-year-old female presents for evaluation of pain both knees more than 3 months duration.  She denies trauma but reports onset of symptoms after she spilled hot soup on her lap and it ran down to her knees.  She started experiencing pain described as sudden in onset, not to the knees but worse at the anterior and medial aspects, none radiating, achy and sore, worse with rising from prolonged sitting, worse with stairs, and improved with changing position.  She does not usually take medication for symptoms.  She has been icing.  She was seen by primary care physician and referred to formal therapy.  She has been attending formal therapy and doing home exercises since 1/18/2024.  She reports only mild improvement in symptoms.    Knee Pain  This is a new problem. The current episode started more than 1 month ago. The problem occurs daily. The problem has been unchanged. Associated symptoms include arthralgias and joint swelling. Pertinent negatives include no numbness or weakness. Exacerbated by: Rising  "from prolonged sitting, stairs. She has tried rest and position changes (Physical therapy, home exercise) for the symptoms. The treatment provided mild relief.           The following portions of the patient's history were reviewed and updated as appropriate: She  has no past medical history on file.  She has No Known Allergies..    Review of Systems   Musculoskeletal:  Positive for arthralgias and joint swelling.   Neurological:  Negative for weakness and numbness.       Objective:  Vitals:    03/13/24 1333   BP: 130/86   Pulse: 98   Weight: 90.7 kg (200 lb)   Height: 5' 5.5\" (1.664 m)      Right Knee Exam     Muscle Strength   The patient has normal right knee strength.    Tenderness   The patient is experiencing no tenderness.     Range of Motion   Extension:  normal   Flexion:  120     Tests   Varus: negative Valgus: negative    Other   Swelling: mild      Left Knee Exam     Muscle Strength   The patient has normal left knee strength.    Tenderness   The patient is experiencing tenderness in the medial joint line.    Range of Motion   Extension:  normal   Flexion:  120     Tests   Varus: negative Valgus: negative    Other   Swelling: mild            Physical Exam  Vitals and nursing note reviewed.   Constitutional:       General: She is not in acute distress.     Appearance: Normal appearance. She is well-developed. She is not ill-appearing or diaphoretic.   HENT:      Head: Normocephalic and atraumatic.      Right Ear: External ear normal.      Left Ear: External ear normal.   Eyes:      Conjunctiva/sclera: Conjunctivae normal.   Neck:      Trachea: No tracheal deviation.   Cardiovascular:      Rate and Rhythm: Normal rate.   Pulmonary:      Effort: Pulmonary effort is normal. No respiratory distress.   Abdominal:      General: There is no distension.   Musculoskeletal:         General: Swelling and tenderness present.   Skin:     General: Skin is warm and dry.      Coloration: Skin is not jaundiced or pale. "   Neurological:      Mental Status: She is alert and oriented to person, place, and time.   Psychiatric:         Mood and Affect: Mood normal.         Behavior: Behavior normal.         Thought Content: Thought content normal.         Judgment: Judgment normal.         I have personally reviewed pertinent films in PACS and my interpretation is  .  hs duration.  Discussed the patient physical exam, radiographs, impression, and plan.  X-rays both knees noted for mild degenerative changes of the medial tibiofemoral space with more pronounced degenerative changes of the patellofemoral spaces.

## 2024-03-14 ENCOUNTER — OFFICE VISIT (OUTPATIENT)
Dept: PHYSICAL THERAPY | Facility: CLINIC | Age: 74
End: 2024-03-14
Payer: MEDICARE

## 2024-03-14 DIAGNOSIS — M25.562 PAIN IN BOTH KNEES, UNSPECIFIED CHRONICITY: Primary | ICD-10-CM

## 2024-03-14 DIAGNOSIS — M25.561 PAIN IN BOTH KNEES, UNSPECIFIED CHRONICITY: Primary | ICD-10-CM

## 2024-03-14 DIAGNOSIS — R26.89 IMPAIRED GAIT AND MOBILITY: ICD-10-CM

## 2024-03-14 PROCEDURE — 97110 THERAPEUTIC EXERCISES: CPT | Performed by: PHYSICAL THERAPIST

## 2024-03-14 NOTE — PROGRESS NOTES
Daily Note     Today's date: 3/14/2024  Patient name: Xochitl Madrigal  : 1950  MRN: [id number]  Referring provider: Siegler, Eugenia L, MD  Dx:   Encounter Diagnosis     ICD-10-CM    1. Pain in both knees, unspecified chronicity  M25.561     M25.562       2. Impaired gait and mobility  R26.89           Start Time: 1030  Stop Time: 1120  Total time in clinic (min): 50 minutes    Subjective: Patient reports that she had an appointment ortho who took XR of her knees and was told she has arthritis, but not enough to warrant surgery. Patient was prescribed OTC supplements and topical Voltaren.      Objective: See treatment diary below      Assessment: Tolerated treatment well. Patient demonstrated fatigue post treatment and would benefit from continued PT. Reviewed diagnostic imaging and discussed conservative treatment options. Emphasized importance of HEP compliance to maximize outcome.      Plan: Continue per plan of care.  Progress treatment as tolerated.       POC expires Unit limit Auth Expiration date PT/OT/ST + Visit Limit?   24 NA NA 24 BOMN                           Visit/Unit Tracking  AUTH Status:  Date 2/29 3/5 3/7 3/12 3/14  2/8 2/14 2/15 2/20 2/22 2/27   N/A - BOMN Used 13 14 15 16 17  7 8 9 10 11 12    Remaining                      Precautions: DM, HTN, Hypothyroidism   Access Code: [email or web address] - updated on 24    Manuals 3/12 3/14  3/5 3/7   B LE - Hams, Calf        R Knee        Reassessment                Neuro Re-Ed         JACKLYN Liu        SLS        Tandem Stance         Sidestepping w/ foam.         TKE w/TBand                        Ther Ex        Patient education: pathophysiology, differential diagnosis, referral to ortho  Diagnostic imaging review, HEP review    GR      NuStep/ bike   ROM and cardio/endurance Nustep 10 min L6 Nustep 10 min L6  Nustep 10 min Nustep 10 min    HR/TR        SLR x 3        LAQ        Marches        QSets        SAQ        SLR        Supine  "hip flexor str.  3x30\" b/l  3x30\" b/l 3x 30\" b/l    Bridges        S/L Hip Abd        Clamshells        Sidestepping with TB at rail        Matrix: knee flexion 35# 3x10 35# 3x10  35# 3x10 35# 3x 10    Matrix: knee extension 25# 3x10 35# 3x10  25# 3x10 25# 3x 10    Supine h/s str.  3x30\" b/l  3x30\" b/l Supine 3x 30\"    Supine vs. Standing adductor str.  Supine 3x30\" b/l  Supine 3x30 b/l Supine 3x30 b/l   Butterfly str.                        Ther Activity        Stepups F/ L  b/l LE         Stepdowns        STS 15# 2x10   15# KB 2x10 15#  2x 10    TG squats  L22 3x10   L22 3x10 L22 3x 10    Lunges 2x10 b/l       LSD    6\" 2x10 b/l 6\" 3x 10            Gait Training                        Modalities        HP/CP prn                              "

## 2024-03-19 ENCOUNTER — OFFICE VISIT (OUTPATIENT)
Dept: PHYSICAL THERAPY | Facility: CLINIC | Age: 74
End: 2024-03-19
Payer: MEDICARE

## 2024-03-19 DIAGNOSIS — M25.561 PAIN IN BOTH KNEES, UNSPECIFIED CHRONICITY: Primary | ICD-10-CM

## 2024-03-19 DIAGNOSIS — R26.89 IMPAIRED GAIT AND MOBILITY: ICD-10-CM

## 2024-03-19 DIAGNOSIS — M25.562 PAIN IN BOTH KNEES, UNSPECIFIED CHRONICITY: Primary | ICD-10-CM

## 2024-03-19 PROCEDURE — 97110 THERAPEUTIC EXERCISES: CPT | Performed by: PHYSICAL THERAPIST

## 2024-03-19 PROCEDURE — 97530 THERAPEUTIC ACTIVITIES: CPT | Performed by: PHYSICAL THERAPIST

## 2024-03-19 NOTE — PROGRESS NOTES
"Daily Note     Today's date: 3/19/2024  Patient name: Xochitl Madrigal  : 1950  MRN: [id number]  Referring provider: Siegler, Eugenia L, MD  Dx:   Encounter Diagnosis     ICD-10-CM    1. Pain in both knees, unspecified chronicity  M25.561     M25.562       2. Impaired gait and mobility  R26.89           Start Time: 1050  Stop Time: 1050  Total time in clinic (min): 0 minutes    Subjective: Patient reports that her knees are a little bothersome d/t the weather.      Objective: See treatment diary below      Assessment: Tolerated treatment well. Patient demonstrated fatigue post treatment and would benefit from continued PT. Patient overall has progressed per POC. Patient is independent with current HEP. Consider D/C at [facility].      Plan: Continue per plan of care.  Progress note during next visit.  Potential discharge next visit. Progress treatment as tolerated.       POC expires Unit limit Auth Expiration date PT/OT/ST + Visit Limit?   24 NA NA 24 BOMN                           Visit/Unit Tracking  AUTH Status:  Date 2/29 3/5 3/7 3/12 3/14 3/19  2/14 2/15 2/20 2/22 2/27   N/A - BOMN Used 13 14 15 16 17 18  8 9 10 11 12    Remaining                      Precautions: DM, HTN, Hypothyroidism   Access Code: [email or web address] - updated on 24    Manuals 3/12 3/14 3/19 3/5 3/7   B LE - Hams, Calf        R Knee        Reassessment                Neuro Re-Ed         JACKLYN Liu        SLS        Tandem Stance         Sidestepping w/ foam.         TKE w/TBand                        Ther Ex        Patient education: pathophysiology, differential diagnosis, referral to ortho  Diagnostic imaging review, HEP review    GR      NuStep/ bike   ROM and cardio/endurance Nustep 10 min L6 Nustep 10 min L6 Rec bike 10 min Nustep 10 min Nustep 10 min    HR/TR        SLR x 3        LAQ        Marches        QSets        SAQ        SLR        Supine hip flexor str.  3x30\" b/l  3x30\" b/l 3x 30\" b/l    Bridges        S/L Hip Abd   " "     Clamshells        Sidestepping with TB at rail        Matrix: knee flexion 35# 3x10 35# 3x10 35# 3x10 35# 3x10 35# 3x 10    Matrix: knee extension 25# 3x10 35# 3x10 35# 3x10 25# 3x10 25# 3x 10    Supine h/s str.  3x30\" b/l  3x30\" b/l Supine 3x 30\"    Supine vs. Standing adductor str.  Supine 3x30\" b/l  Supine 3x30 b/l Supine 3x30 b/l   Butterfly str.                        Ther Activity        Stepups F/ L  b/l LE         Stepdowns        STS 15# 2x10  15# 3x10 15# KB 2x10 15#  2x 10    TG squats  L22 3x10  L22 3x10 L22 3x10 L22 3x 10    Lunges 2x10 b/l       LSD   6\" 2x10 b/l 6\" 2x10 b/l 6\" 3x 10            Gait Training                        Modalities        HP/CP prn                                "

## 2024-03-21 ENCOUNTER — EVALUATION (OUTPATIENT)
Dept: PHYSICAL THERAPY | Facility: CLINIC | Age: 74
End: 2024-03-21
Payer: MEDICARE

## 2024-03-21 DIAGNOSIS — M25.561 PAIN IN BOTH KNEES, UNSPECIFIED CHRONICITY: Primary | ICD-10-CM

## 2024-03-21 DIAGNOSIS — R26.89 IMPAIRED GAIT AND MOBILITY: ICD-10-CM

## 2024-03-21 DIAGNOSIS — M25.562 PAIN IN BOTH KNEES, UNSPECIFIED CHRONICITY: Primary | ICD-10-CM

## 2024-03-21 PROCEDURE — 97110 THERAPEUTIC EXERCISES: CPT | Performed by: PHYSICAL THERAPIST

## 2024-03-21 NOTE — LETTER
2024    Eugenia L Siegler, MD  [address] [address]    Patient: Xochitl Madrigal   YOB: 1950   Date of Visit: 3/21/2024     Encounter Diagnosis     ICD-10-CM    1. Pain in both knees, unspecified chronicity  M25.561     M25.562       2. Impaired gait and mobility  R26.89           Dear Dr. Siegler:    Thank you for your recent referral of Xochitl Madrigal. Please review the attached evaluation summary from Xochitl's recent visit.     Please verify that you agree with the plan of care by signing the attached order.     If you have any questions or concerns, please do not hesitate to call.     I sincerely appreciate the opportunity to share in the care of one of your patients and hope to have another opportunity to work with you in the near future.       Sincerely,    Mic Perez, PT      Referring Provider:      I certify that I have read the below Plan of Care and certify the need for these services furnished under this plan of treatment while under my care.                    Eugenia L Siegler, MD  [address] [address]  Via Mail          PT Re-Evaluation  and PT Discharge    Today's date: 3/21/2024  Patient name: Xochitl Madrigal  : 1950  MRN: [id number]  Referring provider: Siegler, Eugenia L, MD  Dx:   Encounter Diagnosis     ICD-10-CM    1. Pain in both knees, unspecified chronicity  M25.561     M25.562       2. Impaired gait and mobility  R26.89           Start Time: 1100  Stop Time: 1140  Total time in clinic (min): 40 minutes    Assessment  Assessment details: Since beginning physical therapy, Xochitl has attended a total number of 19 visits and has maintained excellent compliance with established POC. Patient has made significant improvements in all areas, including decreased pain, increased strength, increased ROM, improved flexibility, improved joint mobility, and improved overall level of function. Patient is reporting improved  "ability to perform a/iadls, recreational activities, and engaging in social activities. Patient is independent with comprehensive HEP. Patient has been instructed to contact PT if she begins to notice a decline in function or has any questions or concerns. Patient will be discharged at this time. Patient is in agreement with plan.  Other impairment: decreased flexibility  Functional limitations: difficulty with stair negotiationUnderstanding of Dx/Px/POC: good   Prognosis: good    Goals  STGs:  1.  Initiate and complete HEP with verbal cues. MET  2.  Decrease R knee pain by > 25% in 4 weeks. MET  3.  Improve R knee ROM by > 5-10 degrees in 4 weeks. MET  4.  Improve B LE strength by 1/2-1 grade in 4 weeks. MET    LTGs:   1.  Patient to be I with HEP in 12 weeks. MET  2. Improve R knee ROM to 0-120 degrees in 12 weeks to improve function. MET  3. Improve B LE strength to 4+ to 5/5 t/o in 12 weeks to improve function. MET  4. Decrease B knee pain to < or = to 3-4/10 with activity in 12 weeks to improve function. PROGRESSED, BUT NOT MET  5.  Patient to ambulate with normalized gait pattern in 12 weeks. MET  6.  Stair negotiation is improved to PLOF in 12 weeks. MET  7.  Recreational performance is improved to PLOF in 12 weeks. PROGRESSED, BUT NOT MET   8.  ADL performance is improved to PLOF in 12 weeks. PROGRESSED, BUT NOT MET  9.  Improve 5x STS to < 14\" without use of UE in 12 weeks. NOT REASSESSED    Plan  Planned therapy interventions: home exercise program  Duration in visits: 1  Plan of Care beginning date: 3/21/2024  Plan of Care expiration date: 3/21/2024  Treatment plan discussed with: patient      Subjective Evaluation    History of Present Illness  Mechanism of injury: Patient estimates her knee overall level of function to be approximately 80% of her premorbid norm. Patient continues to have pain with excessive weightbearing activity and if she twists the wrong way. Patient on average has less stiffness " "when waking in the morning. Patient's overall intensity of pain has lessened as well. Patient's right knee remains more painful than her left, but her left feels weaker. Patient states that she has noticed that if she's consistent in performing her exercises she has less pain. Patient has been taking Tumeric, tart cherry and Glucosamine as instructed. Patient feels independent with her HEP.   Patient Goals  Patient goals for therapy: decreased pain, increased motion, increased strength and return to sport/leisure activities  Patient goal: \"To help get rid of the pain in my knees, to be able to walk like I used too.\"  Pain  Current pain ratin  At best pain ratin  At worst pain ratin  Location: R Knee > L Knee, posteromedial  Quality: dull ache        Objective     Active Range of Motion   Left Knee   Flexion: WFL  Extension: WFL    Right Knee   Flexion: WFL and with pain  Extension: WFL    Passive Range of Motion   Left Knee   Flexion: WFL  Extension: WFL    Right Knee   Flexion: WFL  Extension: WFL    Mobility   Patellar Mobility:   Left Knee   WFL: medial and lateral.     Right Knee   WFL: medial and lateral    Strength/Myotome Testing     Left Hip   Planes of Motion   Flexion: 5  Abduction: 5    Right Hip   Planes of Motion   Flexion: 5  Abduction: 5    Left Knee   Flexion: 5  Extension: 5    Right Knee   Flexion: 5  Extension: 5    Left Ankle/Foot   Dorsiflexion: 5    Right Ankle/Foot   Dorsiflexion: 5    Ambulation   Weight-Bearing Status   Assistive device used: none    Ambulation: Level Surfaces   Ambulation without assistive device: independent    Observational Gait   Gait: within functional limits     Additional Observational Gait Details  Lacks TKE on R with heel strike.      Functional Assessment      Squat    Left within functional limits and right within functional limits.     Forward Step Up 8\"   Left Leg  Within functional limits.     Right Leg  Within functional limits.     Forward Step " "Down 8\"   Left Leg  Within functional limits.     Right Leg  Within functional limits.   Neuro Exam:     Functional outcomes   5x sit to stand: 22\" (seconds)                    POC expires Unit limit Auth Expiration date PT/OT/ST + Visit Limit?   4/2/24 NA NA 12/31/24 BOMN                           Visit/Unit Tracking  AUTH Status:  Date 2/29 3/5 3/7 3/12 3/14 3/19 3/21  2/15 2/20 2/22 2/27   N/A - BOMN Used 13 14 15 16 17 18 19  9 10 11 12    Remaining                      Precautions: DM, HTN, Hypothyroidism   Access Code: [email or web address] - updated on 2/1/24    Manuals 3/12 3/14 3/19 3/21    B LE - Hams, Calf        R Knee        Reassessment    GR            Neuro Re-Ed         [facility] Lunges        SLS        Tandem Stance         Sidestepping w/ foam.         TKE w/TBand                        Ther Ex        Patient education: pathophysiology, HEP review  Diagnostic imaging review, HEP review    GR  GR    NuStep/ bike   ROM and cardio/endurance Nustep 10 min L6 Nustep 10 min L6 Rec bike 10 min Nustep 10 min L6    HR/TR        SLR x 3        LAQ        Marches        QSets        SAQ        SLR        Supine hip flexor str.  3x30\" b/l      Bridges        S/L Hip Abd        Clamshells        Sidestepping with TB at rail        Matrix: knee flexion 35# 3x10 35# 3x10 35# 3x10     Matrix: knee extension 25# 3x10 35# 3x10 35# 3x10     Supine h/s str.  3x30\" b/l      Supine vs. Standing adductor str.  Supine 3x30\" b/l      Butterfly str.                        Ther Activity        Stepups F/ L  b/l LE         Stepdowns        STS 15# 2x10  15# 3x10     TG squats  L22 3x10  L22 3x10     Lunges 2x10 b/l       LSD   6\" 2x10 b/l             Gait Training                        Modalities        HP/CP prn                          Access Code: [email or web address]  URL: [email or web address]  Date: 01/18/2024  Prepared by: Zofia Dunne  - Seated Long Arc Quad  - 1 x daily - 7 x weekly - 1 sets - 10 reps - 3\" hold  - Seated " March  - 1 x daily - 7 x weekly - 1 sets - 10 reps  - Standing Heel Raise with Support  - 1 x daily - 7 x weekly - 1 sets - 10 reps  - Standing Hip Abduction  - 1 x daily - 7 x weekly - 1 sets - 10 reps  - Standing Hip Extension with Chair  - 1 x daily - 7 x weekly - 1 sets - 10 reps

## 2024-03-21 NOTE — LETTER
2024    Eugenia L Siegler, MD  [address] [facility] [address]    Patient: Xochitl Madrigal   YOB: 1950   Date of Visit: 3/21/2024     Encounter Diagnosis     ICD-10-CM    1. Pain in both knees, unspecified chronicity  M25.561     M25.562       2. Impaired gait and mobility  R26.89           Dear Dr. Siegler:    Thank you for your recent referral of Xochitl Madrigal. Please review the attached evaluation summary from Xochitl's recent visit.     Please verify that you agree with the plan of care by signing the attached order.     If you have any questions or concerns, please do not hesitate to call.     I sincerely appreciate the opportunity to share in the care of one of your patients and hope to have another opportunity to work with you in the near future.       Sincerely,    Mic Perez, PT      Referring Provider:      I certify that I have read the below Plan of Care and certify the need for these services furnished under this plan of treatment while under my care.                    Eugenia L Siegler, MD  [address] [address]  Via Mail          PT Re-Evaluation  and PT Discharge    Today's date: 3/21/2024  Patient name: Xochitl Madrigal  : 1950  MRN: [id number]  Referring provider: Siegler, Eugenia L, MD  Dx:   Encounter Diagnosis     ICD-10-CM    1. Pain in both knees, unspecified chronicity  M25.561     M25.562       2. Impaired gait and mobility  R26.89           Start Time: 1100  Stop Time: 1140  Total time in clinic (min): 40 minutes    Assessment  Assessment details: Since beginning physical therapy, Xochitl has attended a total number of {#:00919} visits and has maintained excellent compliance with established POC. Patient has made significant improvements in all areas, including {Impairment Improvements:19372}. Patient is reporting improved ability to perform {Functional Limitations:29806}. Patient is independent with comprehensive HEP.  "Patient has been instructed to contact PT if {He/she (caps):11032} begins to notice a decline in function or has any questions or concerns. Patient will be discharged at this time. Patient is in agreement with plan.  Other impairment: decreased flexibility  Functional limitations: difficulty with stair negotiationUnderstanding of Dx/Px/POC: good   Prognosis: good    Goals  STGs:  1.  Initiate and complete HEP with verbal cues. MET  2.  Decrease R knee pain by > 25% in 4 weeks. MET  3.  Improve R knee ROM by > 5-10 degrees in 4 weeks. MET  4.  Improve B LE strength by 1/2-1 grade in 4 weeks. MET    LTGs:   1.  Patient to be I with HEP in 12 weeks. MET  2. Improve R knee ROM to 0-120 degrees in 12 weeks to improve function. MET  3. Improve B LE strength to 4+ to 5/5 t/o in 12 weeks to improve function. MET  4. Decrease B knee pain to < or = to 3-4/10 with activity in 12 weeks to improve function. PROGRESSED, BUT NOT MET  5.  Patient to ambulate with normalized gait pattern in 12 weeks. MET  6.  Stair negotiation is improved to PLOF in 12 weeks. MET  7.  Recreational performance is improved to PLOF in 12 weeks. PROGRESSED, BUT NOT MET   8.  ADL performance is improved to PLOF in 12 weeks. PROGRESSED, BUT NOT MET  9.  Improve 5x STS to < 14\" without use of UE in 12 weeks. NOT REASSESSED    Plan  Planned therapy interventions: home exercise program  Duration in visits: 1  Plan of Care beginning date: 3/21/2024  Plan of Care expiration date: 3/21/2024  Treatment plan discussed with: patient      Subjective Evaluation    History of Present Illness  Mechanism of injury: Patient estimates her knee overall level of function to be approximately 80% of her premorbid norm. Patient continues to have pain with excessive weightbearing activity and if she twists the wrong way. Patient on average has less stiffness when waking in the morning. Patient's overall intensity of pain has lessened as well. Patient's right knee remains more " "painful than her left, but her left feels weaker. Patient states that she has noticed that if she's consistent in performing her exercises she has less pain. Patient has been taking Tumeric, tart cherry and Glucosamine as instructed. Patient feels independent with her HEP.   Patient Goals  Patient goals for therapy: decreased pain, increased motion, increased strength and return to sport/leisure activities  Patient goal: \"To help get rid of the pain in my knees, to be able to walk like I used too.\"  Pain  Current pain ratin  At best pain ratin  At worst pain ratin  Location: R Knee > L Knee, posteromedial  Quality: dull ache        Objective     Active Range of Motion   Left Knee   Flexion: WFL  Extension: WFL    Right Knee   Flexion: WFL and with pain  Extension: WFL    Passive Range of Motion   Left Knee   Flexion: WFL  Extension: WFL    Right Knee   Flexion: WFL  Extension: WFL    Mobility   Patellar Mobility:   Left Knee   WFL: medial and lateral.     Right Knee   WFL: medial and lateral    Strength/Myotome Testing     Left Hip   Planes of Motion   Flexion: 5  Abduction: 5    Right Hip   Planes of Motion   Flexion: 5  Abduction: 5    Left Knee   Flexion: 5  Extension: 5    Right Knee   Flexion: 5  Extension: 5    Left Ankle/Foot   Dorsiflexion: 5    Right Ankle/Foot   Dorsiflexion: 5    Ambulation   Weight-Bearing Status   Assistive device used: none    Ambulation: Level Surfaces   Ambulation without assistive device: independent    Observational Gait   Gait: within functional limits     Additional Observational Gait Details  Lacks TKE on R with heel strike.      Functional Assessment      Squat    Left within functional limits and right within functional limits.     Forward Step Up 8\"   Left Leg  Within functional limits.     Right Leg  Within functional limits.     Forward Step Down 8\"   Left Leg  Within functional limits.     Right Leg  Within functional limits.   Neuro Exam:     Functional " "outcomes   5x sit to stand: 22\" (seconds)                    POC expires Unit limit Auth Expiration date PT/OT/ST + Visit Limit?   4/2/24 NA NA 12/31/24 BOMN                           Visit/Unit Tracking  AUTH Status:  Date 2/29 3/5 3/7 3/12 3/14 3/19 3/21  2/15 2/20 2/22 2/27   N/A - BOMN Used 13 14 15 16 17 18 19  9 10 11 12    Remaining                      Precautions: DM, HTN, Hypothyroidism   Access Code: [email or web address] - updated on 2/1/24    Manuals 3/12 3/14 3/19 3/21    B LE - Hams, Calf        R Knee        Reassessment    GR            Neuro Re-Ed         [facility] Lunges        SLS        Tandem Stance         Sidestepping w/ foam.         TKE w/TBand                        Ther Ex        Patient education: pathophysiology, HEP review  Diagnostic imaging review, HEP review    GR  GR    NuStep/ bike   ROM and cardio/endurance Nustep 10 min L6 Nustep 10 min L6 Rec bike 10 min Nustep 10 min L6    HR/TR        SLR x 3        LAQ        Marches        QSets        SAQ        SLR        Supine hip flexor str.  3x30\" b/l      Bridges        S/L Hip Abd        Clamshells        Sidestepping with TB at rail        Matrix: knee flexion 35# 3x10 35# 3x10 35# 3x10     Matrix: knee extension 25# 3x10 35# 3x10 35# 3x10     Supine h/s str.  3x30\" b/l      Supine vs. Standing adductor str.  Supine 3x30\" b/l      Butterfly str.                        Ther Activity        Stepups F/ L  b/l LE         Stepdowns        STS 15# 2x10  15# 3x10     TG squats  L22 3x10  L22 3x10     Lunges 2x10 b/l       LSD   6\" 2x10 b/l             Gait Training                        Modalities        HP/CP prn                          Access Code: [email or web address]  URL: [email or web address]  Date: 01/18/2024  Prepared by: Zofia    Exercises  - Seated Long Arc Quad  - 1 x daily - 7 x weekly - 1 sets - 10 reps - 3\" hold  - Seated March  - 1 x daily - 7 x weekly - 1 sets - 10 reps  - Standing Heel Raise with Support  - 1 x daily - 7 x weekly - 1 " sets - 10 reps  - Standing Hip Abduction  - 1 x daily - 7 x weekly - 1 sets - 10 reps  - Standing Hip Extension with Chair  - 1 x daily - 7 x weekly - 1 sets - 10 reps

## 2024-03-21 NOTE — PROGRESS NOTES
PT Re-Evaluation  and PT Discharge    Today's date: 3/21/2024  Patient name: Xochitl Madrigal  : 1950  MRN: [id number]  Referring provider: Siegler, Eugenia L, MD  Dx:   Encounter Diagnosis     ICD-10-CM    1. Pain in both knees, unspecified chronicity  M25.561     M25.562       2. Impaired gait and mobility  R26.89           Start Time: 1100  Stop Time: 1140  Total time in clinic (min): 40 minutes    Assessment  Assessment details: Since beginning physical therapy, Xochitl has attended a total number of 19 visits and has maintained excellent compliance with established POC. Patient has made significant improvements in all areas, including decreased pain, increased strength, increased ROM, improved flexibility, improved joint mobility, and improved overall level of function. Patient is reporting improved ability to perform a/iadls, recreational activities, and engaging in social activities. Patient is independent with comprehensive HEP. Patient has been instructed to contact PT if she begins to notice a decline in function or has any questions or concerns. Patient will be discharged at this time. Patient is in agreement with plan.  Other impairment: decreased flexibility  Functional limitations: difficulty with stair negotiationUnderstanding of Dx/Px/POC: good   Prognosis: good    Goals  STGs:  1.  Initiate and complete HEP with verbal cues. MET  2.  Decrease R knee pain by > 25% in 4 weeks. MET  3.  Improve R knee ROM by > 5-10 degrees in 4 weeks. MET  4.  Improve B LE strength by 1/2-1 grade in 4 weeks. MET    LTGs:   1.  Patient to be I with HEP in 12 weeks. MET  2. Improve R knee ROM to 0-120 degrees in 12 weeks to improve function. MET  3. Improve B LE strength to 4+ to 5/5 t/o in 12 weeks to improve function. MET  4. Decrease B knee pain to < or = to 3-4/10 with activity in 12 weeks to improve function. PROGRESSED, BUT NOT MET  5.  Patient to ambulate with normalized gait pattern in 12 weeks.  "MET  6.  Stair negotiation is improved to PLOF in 12 weeks. MET  7.  Recreational performance is improved to PLOF in 12 weeks. PROGRESSED, BUT NOT MET   8.  ADL performance is improved to PLOF in 12 weeks. PROGRESSED, BUT NOT MET  9.  Improve 5x STS to < 14\" without use of UE in 12 weeks. NOT REASSESSED    Plan  Planned therapy interventions: home exercise program  Duration in visits: 1  Plan of Care beginning date: 3/21/2024  Plan of Care expiration date: 3/21/2024  Treatment plan discussed with: patient      Subjective Evaluation    History of Present Illness  Mechanism of injury: Patient estimates her knee overall level of function to be approximately 80% of her premorbid norm. Patient continues to have pain with excessive weightbearing activity and if she twists the wrong way. Patient on average has less stiffness when waking in the morning. Patient's overall intensity of pain has lessened as well. Patient's right knee remains more painful than her left, but her left feels weaker. Patient states that she has noticed that if she's consistent in performing her exercises she has less pain. Patient has been taking Tumeric, tart cherry and Glucosamine as instructed. Patient feels independent with her HEP.   Patient Goals  Patient goals for therapy: decreased pain, increased motion, increased strength and return to sport/leisure activities  Patient goal: \"To help get rid of the pain in my knees, to be able to walk like I used too.\"  Pain  Current pain ratin  At best pain ratin  At worst pain ratin  Location: R Knee > L Knee, posteromedial  Quality: dull ache        Objective     Active Range of Motion   Left Knee   Flexion: WFL  Extension: WFL    Right Knee   Flexion: WFL and with pain  Extension: WFL    Passive Range of Motion   Left Knee   Flexion: WFL  Extension: WFL    Right Knee   Flexion: WFL  Extension: WFL    Mobility   Patellar Mobility:   Left Knee   WFL: medial and lateral.     Right Knee   WFL: " "medial and lateral    Strength/Myotome Testing     Left Hip   Planes of Motion   Flexion: 5  Abduction: 5    Right Hip   Planes of Motion   Flexion: 5  Abduction: 5    Left Knee   Flexion: 5  Extension: 5    Right Knee   Flexion: 5  Extension: 5    Left Ankle/Foot   Dorsiflexion: 5    Right Ankle/Foot   Dorsiflexion: 5    Ambulation   Weight-Bearing Status   Assistive device used: none    Ambulation: Level Surfaces   Ambulation without assistive device: independent    Observational Gait   Gait: within functional limits     Additional Observational Gait Details  Lacks TKE on R with heel strike.      Functional Assessment      Squat    Left within functional limits and right within functional limits.     Forward Step Up 8\"   Left Leg  Within functional limits.     Right Leg  Within functional limits.     Forward Step Down 8\"   Left Leg  Within functional limits.     Right Leg  Within functional limits.   Neuro Exam:     Functional outcomes   5x sit to stand: 22\" (seconds)                    POC expires Unit limit Auth Expiration date PT/OT/ST + Visit Limit?   4/2/24 NA NA 12/31/24 BOMN                           Visit/Unit Tracking  AUTH Status:  Date 2/29 3/5 3/7 3/12 3/14 3/19 3/21  2/15 2/20 2/22 2/27   N/A - BOMN Used 13 14 15 16 17 18 19  9 10 11 12    Remaining                      Precautions: DM, HTN, Hypothyroidism   Access Code: [email or web address] - updated on 2/1/24    Manuals 3/12 3/14 3/19 3/21    B LE - Hams, Calf        R Knee        Reassessment    GR            Neuro Re-Ed         BEAR[facility] Lunges        SLS        Tandem Stance         Sidestepping w/ foam.         TKE w/TBand                        Ther Ex        Patient education: pathophysiology, HEP review  Diagnostic imaging review, HEP review    GR  GR    NuStep/ bike   ROM and cardio/endurance Nustep 10 min L6 Nustep 10 min L6 Rec bike 10 min Nustep 10 min L6    HR/TR        SLR x 3        LAQ        Marches        QSets        SAQ        SLR        Supine " "hip flexor str.  3x30\" b/l      Bridges        S/L Hip Abd        Clamshells        Sidestepping with TB at rail        Matrix: knee flexion 35# 3x10 35# 3x10 35# 3x10     Matrix: knee extension 25# 3x10 35# 3x10 35# 3x10     Supine h/s str.  3x30\" b/l      Supine vs. Standing adductor str.  Supine 3x30\" b/l      Butterfly str.                        Ther Activity        Stepups F/ L  b/l LE         Stepdowns        STS 15# 2x10  15# 3x10     TG squats  L22 3x10  L22 3x10     Lunges 2x10 b/l       LSD   6\" 2x10 b/l             Gait Training                        Modalities        HP/CP prn                          Access Code: [email or web address]  URL: [email or web address]  Date: 03/21/2024  Prepared by: Mic Perez    Exercises  - Seated Long Arc Quad  - 1 x daily - 4 x weekly - 3 sets - 10 reps - 3\" hold  - Standing Heel Raise with Support  - 1 x daily - 4 x weekly - 3 sets - 10 reps  - Standing Hip Abduction  - 1 x daily - 4 x weekly - 3 sets - 10 reps  - Standing Hip Extension with Chair  - 1 x daily - 4 x weekly - 3 sets - 10 reps  - Clamshell  - 1 x daily - 4 x weekly - 2 sets - 10 reps - 5 seconds hold  - Supine Active Straight Leg Raise  - 1 x daily - 4 x weekly - 3 sets - 10 reps  - Sidelying Hip Abduction  - 1 x daily - 4 x weekly - 3 sets - 10 reps  - Side Stepping with Resistance at Ankles  - 1 x daily - 4 x weekly - 1 sets - 10 reps  - Squat with Chair Touch  - 1 x daily - 4 x weekly - 3 sets - 10 reps  - Forward Step Up with Counter Support  - 1 x daily - 4 x weekly - 3 sets - 10 reps  - Forward Step Down with Heel Tap and Counter Support  - 1 x daily - 4 x weekly - 3 sets - 10 reps  - Side Step Down with Counter Support  - 1 x daily - 4 x weekly - 3 sets - 10 reps  - Supine Hamstring Stretch with Strap  - 1 x daily - 4 x weekly - 3 sets - 1 reps - 30 seconds hold  - Hip Adductors and Hamstring Stretch with Strap  - 1 x daily - 4 x weekly - 3 sets - 1 reps - 30 seconds hold  - Supine Quadriceps " Stretch with Strap on Table  - 1 x daily - 4 x weekly - 3 sets - 1 reps - 30 seconds hold

## 2024-05-07 ENCOUNTER — EVALUATION (OUTPATIENT)
Dept: OCCUPATIONAL THERAPY | Facility: CLINIC | Age: 74
End: 2024-05-07
Payer: MEDICARE

## 2024-05-07 DIAGNOSIS — M79.644 PAIN OF RIGHT THUMB: Primary | ICD-10-CM

## 2024-05-07 PROCEDURE — 97165 OT EVAL LOW COMPLEX 30 MIN: CPT

## 2024-05-07 PROCEDURE — 97110 THERAPEUTIC EXERCISES: CPT

## 2024-05-07 NOTE — PROGRESS NOTES
OT Evaluation     Today's date: 2024  Patient name: Xochitl Madrigal  : 1950  MRN: [id number]  Referring provider: Siegler, Eugenia L, MD  Dx:   Encounter Diagnosis     ICD-10-CM    1. Pain of right thumb  M79.644           Start Time: 1030  Stop Time: 1115  Total time in clinic (min): 45 minutes    Assessment  Assessment details: Xochitl Madrigal is a 74 y.o., Right HD female referred to hand therapy for R thumb pain.  Onset of pain and symptoms 2-3 months ago due to unknown etiology.  Patient presents 23 with impaired ROM, strength, and coordination of the R thumb and hand.  Deficits also noted in pain and functional use of the right hand. Patient has palpable nodule at her A1 pulley of her R thumb that is tender to palpation. Active triggering of R thumb noted with composite flexion. Provided patient with HEP focused on thumb extension/abduction and IP blocking. Patient is a good candidate for OT services to decrease pain and triggering and restore ROM, strength, and coordination for a return to independence in daily tasks.   Impairments: abnormal muscle firing, abnormal or restricted ROM, activity intolerance, impaired physical strength, lacks appropriate home exercise program and pain with function  Other impairment: Active triggering/locking of R thumb  Functional limitations: Lifting, grasping  Symptom irritability: moderateUnderstanding of Dx/Px/POC: excellent   Prognosis: good    Goals  STGs (4 weeks)  Patient will be independent in implementing HEP prescribed by therapist  Patient will report an average pain level of 4/10 with ADLs  Patient will demonstrate 10 degree improvement in GILMORE of the R thumb for improved use of hand in household chores  Decrease triggering by 50% at R thumb  LTGs (8 weeks)  Patient will demonstrate independence in a HEP to maintain ROM, strength, and function at discharge  Patient will report an average pain level of 1-2/10 to be independent in daily  "tasks  Patient will demonstrate GILMORE of the thumb to WFLs to be independent in self care tasks  Patient will achieve goals as demonstrated by FOTO results  Patient will demonstrate resolution of triggering of R thumb     Plan  Patient would benefit from: custom splinting, orthotics and skilled occupational therapy  Planned modality interventions: thermotherapy: hydrocollator packs, thermotherapy: paraffin bath, ultrasound and cryotherapy  Planned therapy interventions: IASTM, joint mobilization, kinesiology taping, manual therapy, massage, neuromuscular re-education, orthotic fitting/training, orthotic management and training, patient education, strengthening, stretching, therapeutic activities, therapeutic exercise, home exercise program, graded exercise, graded activity, functional ROM exercises and flexibility  Frequency: 2x week  Duration in weeks: 8  Plan of Care beginning date: 2024  Plan of Care expiration date: 2024  Treatment plan discussed with: patient        Subjective Evaluation    History of Present Illness  Mechanism of injury: Xochitl Madrigal is a 74 y.o. female presenting to OT with pain at her thumb specifically in her IP joint. Onset of symptoms 2-3 months due to unknown etiology. Pain is at its worst with movement and general activity. Patient experiencing functional deficits in all daily activities due to pain and inability to fully flex thumb. She notices locking and her thumb getting stuck when she bends it.  Patient Goals  Patient goals for therapy: increased motion and decreased pain  Patient goal: \"Use it without fear of pain\"  Pain  Current pain ratin  At best pain ratin  At worst pain ratin  Location: R thumb IP joint  Quality: sharp and dull ache (achy when at rest, sharp when her thumb locks)  Aggravating factors: lifting (general activity)  Progression: no change    Social Support  Lives in: multiple-level home  Lives with: spouse    Employment status: not " working  Hand dominance: right      Diagnostic Tests  No diagnostic tests performed  Treatments  Current treatment: occupational therapy        Objective     Observations     Right Wrist/Hand   Positive for trigger finger.     Additional Observation Details  R thumb triggering    Tenderness     Right Wrist/Hand   Tenderness in the DIP joint and MCP joint.     Additional Tenderness Details  A1 pulley of R thumb    Neurological Testing     Additional Neurological Details  Patient denies any N/T in digits    Active Range of Motion     Right Wrist   Wrist flexion: WFL  Wrist extension: WFl  Radial deviation: WFL  Ulnar deviation: WFL    Left Thumb   Flexion     MP: 65 degrees    DIP: 65 degrees      Right Thumb   Flexion     MP: 55    DIP: 45  Palmar Abduction    CMC: 39  Radial Abduction    CMC: 39  Opposition: Opposes to PIP joint of RSF    Additional Active Range of Motion Details  Digits 2-5 grossly WFLs    Strength/Myotome Testing     Left Wrist/Hand      (2nd hand position)     Trial 1: 45    Thumb Strength  Key/Lateral Pinch     Trial 1: 15  Tip/Two-Point Pinch     Trial 1: 10  Palmar/Three-Point Pinch     Trial 1: 13    Right Wrist/Hand      (2nd hand position)     Trial 1: 20    Thumb Strength   Key/Lateral Pinch     Trial 1: 8  Tip/Two-Point Pinch     Trial 1: 7  Palmar/Three-Point Pinch     Trial 1: 9    Tests     Right Wrist/Hand   Negative CMC grind, CMC lever test positive and CMC shoulder sign.              Access Code: [email or web address]  URL: [email or web address]  Date: 05/07/2024  Prepared by: Elpidio Costa    Exercises  - Seated Thumb Extension  - 3 x daily - 7 x weekly - 20 reps  - Thumb Abduction AROM on Table  - 3 x daily - 7 x weekly - 20 reps  - Seated Thumb Palmar Abduction Adduction AROM  - 3 x daily - 7 x weekly - 20 reps  - Thumb AROM IP Blocking  - 3 x daily - 7 x weekly - 20 reps  Precautions: Universal  POC expires Unit limit Auth  expiration date PT/OT + Visit Limit?   7/2/24 na   BOMN                 Visit/Unit Tracking  AUTH Status:  Date 5/7 IE              BOMN, RE due 6/7/24 Used 1               Remaining                     Manuals 5/7 IE        IASTM         STM                           Orthosis NV                                                                       Ther Ex         Thumb ROM (avoid comp flexion) Ext from table x20  RA x20  PA x20        IP blocking x20        Thumb stretches                                                      Ther Activity                           HEP Educ on exercises, educ on avoiding composite flexion of thumb                          Modalities         P 5'        US

## 2024-05-07 NOTE — LETTER
May 7, 2024    Eugenia L Siegler, MD  [address] [address]    Patient: Xochitl Madrigal   YOB: 1950   Date of Visit: 2024     Encounter Diagnosis     ICD-10-CM    1. Pain of right thumb  M79.644           Dear Dr. Siegler:    Thank you for your recent referral of Xochitl Madrigal. Please review the attached evaluation summary from Xochitl's recent visit.     Please verify that you agree with the plan of care by signing the attached order.     If you have any questions or concerns, please do not hesitate to call.     I sincerely appreciate the opportunity to share in the care of one of your patients and hope to have another opportunity to work with you in the near future.     Sincerely,    Elpidio Costa, OT      Referring Provider:     I certify that I have read the below Plan of Care and certify the need for these services furnished under this plan of treatment while under my care.                    Eugenia L Siegler, MD  [address] [address]  Via Mail        OT Evaluation     Today's date: 2024  Patient name: Xochitl Madrigal  : 1950  MRN: [id number]  Referring provider: Siegler, Eugenia L, MD  Dx:   Encounter Diagnosis     ICD-10-CM    1. Pain of right thumb  M79.644           Start Time: 1030  Stop Time: 1115  Total time in clinic (min): 45 minutes    Assessment  Assessment details: Xochitl Madrigal is a 74 y.o., Right HD female referred to hand therapy for R thumb pain.  Onset of pain and symptoms 2-3 months ago due to unknown etiology.  Patient presents 23 with impaired ROM, strength, and coordination of the R thumb and hand.  Deficits also noted in pain and functional use of the right hand. Patient has palpable nodule at her A1 pulley of her R thumb that is tender to palpation. Active triggering of R thumb noted with composite flexion. Provided patient with HEP focused on thumb extension/abduction and IP blocking. Patient is a good  candidate for OT services to decrease pain and triggering and restore ROM, strength, and coordination for a return to independence in daily tasks.   Impairments: abnormal muscle firing, abnormal or restricted ROM, activity intolerance, impaired physical strength, lacks appropriate home exercise program and pain with function  Other impairment: Active triggering/locking of R thumb  Functional limitations: Lifting, grasping  Symptom irritability: moderateUnderstanding of Dx/Px/POC: excellent   Prognosis: good    Goals  STGs (4 weeks)  Patient will be independent in implementing HEP prescribed by therapist  Patient will report an average pain level of 4/10 with ADLs  Patient will demonstrate 10 degree improvement in GILMORE of the R thumb for improved use of hand in household chores  Decrease triggering by 50% at R thumb  LTGs (8 weeks)  Patient will demonstrate independence in a HEP to maintain ROM, strength, and function at discharge  Patient will report an average pain level of 1-2/10 to be independent in daily tasks  Patient will demonstrate GILMORE of the thumb to WFLs to be independent in self care tasks  Patient will achieve goals as demonstrated by FOTO results  Patient will demonstrate resolution of triggering of R thumb     Plan  Patient would benefit from: custom splinting, orthotics and skilled occupational therapy  Planned modality interventions: thermotherapy: hydrocollator packs, thermotherapy: paraffin bath, ultrasound and cryotherapy  Planned therapy interventions: IASTM, joint mobilization, kinesiology taping, manual therapy, massage, neuromuscular re-education, orthotic fitting/training, orthotic management and training, patient education, strengthening, stretching, therapeutic activities, therapeutic exercise, home exercise program, graded exercise, graded activity, functional ROM exercises and flexibility  Frequency: 2x week  Duration in weeks: 8  Plan of Care beginning date: 5/7/2024  Plan of Care  "expiration date: 2024  Treatment plan discussed with: patient        Subjective Evaluation    History of Present Illness  Mechanism of injury: Xochitl Madrigal is a 74 y.o. female presenting to OT with pain at her thumb specifically in her IP joint. Onset of symptoms 2-3 months due to unknown etiology. Pain is at its worst with movement and general activity. Patient experiencing functional deficits in all daily activities due to pain and inability to fully flex thumb. She notices locking and her thumb getting stuck when she bends it.  Patient Goals  Patient goals for therapy: increased motion and decreased pain  Patient goal: \"Use it without fear of pain\"  Pain  Current pain ratin  At best pain ratin  At worst pain ratin  Location: R thumb IP joint  Quality: sharp and dull ache (achy when at rest, sharp when her thumb locks)  Aggravating factors: lifting (general activity)  Progression: no change    Social Support  Lives in: multiple-level home  Lives with: spouse    Employment status: not working  Hand dominance: right      Diagnostic Tests  No diagnostic tests performed  Treatments  Current treatment: occupational therapy        Objective     Observations     Right Wrist/Hand   Positive for trigger finger.     Additional Observation Details  R thumb triggering    Tenderness     Right Wrist/Hand   Tenderness in the DIP joint and MCP joint.     Additional Tenderness Details  A1 pulley of R thumb    Neurological Testing     Additional Neurological Details  Patient denies any N/T in digits    Active Range of Motion     Right Wrist   Wrist flexion: WFL  Wrist extension: WFl  Radial deviation: WFL  Ulnar deviation: WFL    Left Thumb   Flexion     MP: 65 degrees    DIP: 65 degrees      Right Thumb   Flexion     MP: 55    DIP: 45  Palmar Abduction    CMC: 39  Radial Abduction    CMC: 39  Opposition: Opposes to PIP joint of RSF    Additional Active Range of Motion Details  Digits 2-5 grossly " WFLs    Strength/Myotome Testing     Left Wrist/Hand      (2nd hand position)     Trial 1: 45    Thumb Strength  Key/Lateral Pinch     Trial 1: 15  Tip/Two-Point Pinch     Trial 1: 10  Palmar/Three-Point Pinch     Trial 1: 13    Right Wrist/Hand      (2nd hand position)     Trial 1: 20    Thumb Strength   Key/Lateral Pinch     Trial 1: 8  Tip/Two-Point Pinch     Trial 1: 7  Palmar/Three-Point Pinch     Trial 1: 9    Tests     Right Wrist/Hand   Negative CMC grind, CMC lever test positive and CMC shoulder sign.              Access Code: [email or web address]  URL: [email or web address]  Date: 05/07/2024  Prepared by: Elpidio Costa    Exercises  - Seated Thumb Extension  - 3 x daily - 7 x weekly - 20 reps  - Thumb Abduction AROM on Table  - 3 x daily - 7 x weekly - 20 reps  - Seated Thumb Palmar Abduction Adduction AROM  - 3 x daily - 7 x weekly - 20 reps  - Thumb AROM IP Blocking  - 3 x daily - 7 x weekly - 20 reps  Precautions: [place]  POC expires Unit limit Auth  expiration date PT/OT + Visit Limit?   7/2/24 na  BOMN                 Visit/Unit Tracking  AUTH Status:  Date 5/7 IE              BOMN, RE due 6/7/24 Used 1               Remaining                     Manuals 5/7 IE        IASTM         STM                           Orthosis NV                                                                       Ther Ex         Thumb ROM (avoid comp flexion) Ext from table x20  RA x20  PA x20        IP blocking x20        Thumb stretches                                                      Ther Activity                           HEP Educ on exercises, educ on avoiding composite flexion of thumb                          Modalities         P 5'        US

## 2024-05-14 ENCOUNTER — OFFICE VISIT (OUTPATIENT)
Dept: OCCUPATIONAL THERAPY | Facility: CLINIC | Age: 74
End: 2024-05-14
Payer: MEDICARE

## 2024-05-14 DIAGNOSIS — M79.644 PAIN OF RIGHT THUMB: Primary | ICD-10-CM

## 2024-05-14 PROCEDURE — 97035 APP MDLTY 1+ULTRASOUND EA 15: CPT

## 2024-05-14 PROCEDURE — 97140 MANUAL THERAPY 1/> REGIONS: CPT

## 2024-05-14 PROCEDURE — 97110 THERAPEUTIC EXERCISES: CPT

## 2024-05-14 NOTE — PROGRESS NOTES
Daily Note     Today's date: 2024  Patient name: Xochitl Madrigal  : 1950  MRN: [id number]  Referring provider: Siegler, Eugenia L, MD  Dx:   Encounter Diagnosis     ICD-10-CM    1. Pain of right thumb  M79.644           Start Time: 1530  Stop Time: 1615  Total time in clinic (min): 45 minutes    Subjective: Patient reports decreased stiffness and pain. Continues to have locking in joint of thumb.      Objective: See treatment diary below      Assessment: Tolerated treatment well. Patient exhibited good technique with therapeutic exercises and would benefit from continued OT. Patient responded well to U/S with decreased tenderness over A1 pulley of thumb. Educated patient on use of orthosis for thumb to prevent triggering and to avoid composite flexion for the next 3 weeks.      Plan: Continue per plan of care.      Access Code: [email or web address]  Precautions: [place]  POC expires Unit limit Auth  expiration date PT/OT + Visit Limit?   24 na  BOMN                 Visit/Unit Tracking  AUTH Status:  Date  IE              BOMN, RE due 24 Used 1 2              Remaining                     Manuals  IE        IASTM  10'       STM                           Orthosis NV Orficast circumferential to prevent comp thumb flexion 10'                                                                      Ther Ex         Thumb ROM (avoid comp flexion) Ext from table x20  RA x20  PA x20 Ext from table x20  RA x20       IP blocking x20 x20       Thumb stretches                                                      Ther Activity                           HEP Educ on exercises, educ on avoiding composite flexion of thumb                          Modalities         MHP 5' 5'       US 50% pulse, 3.3 mhz, 0.8 w/cm2  8'

## 2024-05-16 ENCOUNTER — OFFICE VISIT (OUTPATIENT)
Dept: OCCUPATIONAL THERAPY | Facility: CLINIC | Age: 74
End: 2024-05-16
Payer: MEDICARE

## 2024-05-16 DIAGNOSIS — M79.644 PAIN OF RIGHT THUMB: Primary | ICD-10-CM

## 2024-05-16 PROCEDURE — 97110 THERAPEUTIC EXERCISES: CPT

## 2024-05-16 PROCEDURE — 97140 MANUAL THERAPY 1/> REGIONS: CPT

## 2024-05-16 NOTE — PROGRESS NOTES
Daily Note     Today's date: 2024  Patient name: Xochitl Madrigal  : 1950  MRN: [id number]  Referring provider: Siegler, Eugenia L, MD  Dx:   Encounter Diagnosis     ICD-10-CM    1. Pain of right thumb  M79.644           Start Time: 1103  Stop Time: 1142  Total time in clinic (min): 39 minutes    Subjective: Patient reports she lost her custom splint that was fabricated last session.      Objective: See treatment diary below      Assessment: Tolerated treatment well. Patient exhibited good technique with therapeutic exercises and would benefit from continued OT. Fabricated and issued 2 Oval 8 orthoses today to prevent triggering of R thumb. She was instructed to wear at all times with activity for the next 3 weeks and to only remove while resting and to be mindful of not fully flexing thumb.      Plan: Continue per plan of care.      Access Code: [email or web address]  Precautions: [place]  POC expires Unit limit Auth  expiration date PT/OT + Visit Limit?   24 na  BOMN                 Visit/Unit Tracking  AUTH Status:  Date  IE              BOMN, RE due 24 Used 1 2              Remaining                     Manuals  IE       IASTM  10' 3'      STM   7'                        Orthosis NV Orficast circumferential to prevent comp thumb flexion 10' 2 Oval 8 orthoses 10'                                                                     Ther Ex         Thumb ROM (avoid comp flexion) Ext from table x20  RA x20  PA x20 Ext from table x20  RA x20 Ext from table x20  RA x20      IP blocking x20 x20 x20      Thumb stretches                                                      Ther Activity                           HEP Educ on exercises, educ on avoiding composite flexion of thumb                          Modalities         MHP 5' 5' 5'      US 50% pulse, 3.3 mhz, 0.8 w/cm2  8' 8'

## 2024-05-21 ENCOUNTER — OFFICE VISIT (OUTPATIENT)
Dept: OCCUPATIONAL THERAPY | Facility: CLINIC | Age: 74
End: 2024-05-21
Payer: MEDICARE

## 2024-05-21 DIAGNOSIS — M79.644 PAIN OF RIGHT THUMB: Primary | ICD-10-CM

## 2024-05-21 PROCEDURE — 97110 THERAPEUTIC EXERCISES: CPT

## 2024-05-21 PROCEDURE — 97140 MANUAL THERAPY 1/> REGIONS: CPT

## 2024-05-21 NOTE — PROGRESS NOTES
"Daily Note     Today's date: 2024  Patient name: Xochitl Madrigal  : 1950  MRN: [id number]  Referring provider: Siegler, Eugenia L, MD  Dx:   Encounter Diagnosis     ICD-10-CM    1. Pain of right thumb  M79.644           Start Time: 1145  Stop Time: 1225  Total time in clinic (min): 40 minutes    Subjective: \"It's sore when I do that\" (thumb IP blocking exercise)      Objective: See treatment diary below      Assessment: Tolerated treatment well. Patient exhibited good technique with therapeutic exercises and would benefit from continued OT. Patient has been compliant with wearing thumb orthosis and has decreased tenderness at A1 pulley. Nodule still present in this area but patient has decreased pain overall.       Plan: Continue per plan of care.      Access Code: [email or web address]  Precautions: [place]  POC expires Unit limit Auth  expiration date PT/OT + Visit Limit?   24 na  BOMN                 Visit/Unit Tracking  AUTH Status:  Date  IE            BOMN, RE due 24 Used 1 2 3 4            Remaining                     Manuals  IE      IASTM  10' 3' 12'     STM   7'                        Orthosis NV Orficast circumferential to prevent comp thumb flexion 10' 2 Oval 8 orthoses 10'                                                                     Ther Ex         Thumb ROM (avoid comp flexion) Ext from table x20  RA x20  PA x20 Ext from table x20  RA x20 Ext from table x20  RA x20 Ext from table x20  RA x20     IP blocking x20 x20 x20 x20     Thumb stretches                                                      Ther Activity                           HEP Educ on exercises, educ on avoiding composite flexion of thumb                          Modalities         MHP 5' 5' 5' 5'     US 50% pulse, 3.3 mhz, 0.8 w/cm2  8' 8' 8'              "

## 2024-05-23 ENCOUNTER — OFFICE VISIT (OUTPATIENT)
Dept: OCCUPATIONAL THERAPY | Facility: CLINIC | Age: 74
End: 2024-05-23
Payer: MEDICARE

## 2024-05-23 DIAGNOSIS — M79.644 PAIN OF RIGHT THUMB: Primary | ICD-10-CM

## 2024-05-23 PROCEDURE — 97140 MANUAL THERAPY 1/> REGIONS: CPT

## 2024-05-23 PROCEDURE — 97110 THERAPEUTIC EXERCISES: CPT

## 2024-05-23 NOTE — PROGRESS NOTES
"Daily Note     Today's date: 2024  Patient name: Xochitl Madrigal  : 1950  MRN: [id number]  Referring provider: Siegler, Eugenia L, MD  Dx:   Encounter Diagnosis     ICD-10-CM    1. Pain of right thumb  M79.644           Start Time: 1130  Stop Time: 1217  Total time in clinic (min): 47 minutes    Subjective: \"The pain is getting better\"      Objective: See treatment diary below      Assessment: Tolerated treatment well. Patient exhibited good technique with therapeutic exercises and would benefit from continued OT. Instructed patient in thumb extension stretches and added to HEP.      Plan: Continue per plan of care.      Access Code: [email or web address]  Precautions: [place]  POC expires Unit limit Auth  expiration date PT/OT + Visit Limit?   24 na  BOMN                 Visit/Unit Tracking  AUTH Status:  Date  IE           BOMN, RE due 24 Used 1 2 3 4 5           Remaining                     Manuals  IE     IASTM  10' 3' 12' 12'    STM   7'                        Orthosis NV Orficast circumferential to prevent comp thumb flexion 10' 2 Oval 8 orthoses 10'                                                                     Ther Ex         Thumb ROM (avoid comp flexion) Ext from table x20  RA x20  PA x20 Ext from table x20  RA x20 Ext from table x20  RA x20 Ext from table x20  RA x20 Ext from table x20    RA x20    IP blocking x20 x20 x20 x20     Thumb stretches     Abduction stretch 10 x 5\", add to HEP, review/demo                                                 Ther Activity                           HEP Educ on exercises, educ on avoiding composite flexion of thumb    Thumb abduction stretch                      Modalities         MHP 5' 5' 5' 5' 5'    US 50% pulse, 3.3 mhz, 0.8 w/cm2  8' 8' 8' 8'               "

## 2024-05-28 ENCOUNTER — OFFICE VISIT (OUTPATIENT)
Dept: OCCUPATIONAL THERAPY | Facility: CLINIC | Age: 74
End: 2024-05-28
Payer: MEDICARE

## 2024-05-28 DIAGNOSIS — M79.644 PAIN OF RIGHT THUMB: Primary | ICD-10-CM

## 2024-05-28 PROCEDURE — 97110 THERAPEUTIC EXERCISES: CPT

## 2024-05-28 PROCEDURE — 97035 APP MDLTY 1+ULTRASOUND EA 15: CPT

## 2024-05-28 PROCEDURE — 97140 MANUAL THERAPY 1/> REGIONS: CPT

## 2024-05-28 NOTE — PROGRESS NOTES
"Daily Note     Today's date: 2024  Patient name: Xochitl Madrigal  : 1950  MRN: [id number]  Referring provider: Siegler, Eugenia L, MD  Dx:   Encounter Diagnosis     ICD-10-CM    1. Pain of right thumb  M79.644           Start Time: 1135  Stop Time: 1220  Total time in clinic (min): 45 minutes    Subjective: \"The thumb is feeling good. I haven't let it lock at all\"      Objective: See treatment diary below      Assessment: Tolerated treatment well. Patient exhibited good technique with therapeutic exercises and would benefit from continued OT. Patient continues to have tenderness with pressure/palpation over A1 pulley of R thumb. She remains pain-free at rest.      Plan: Continue per plan of care.      Access Code: [email or web address]  Precautions: [place]  POC expires Unit limit Auth  expiration date PT/OT + Visit Limit?   24 na  BOMN                 Visit/Unit Tracking  AUTH Status:  Date  IE          BOMN, RE due 24 Used 1 2 3 4 5 6          Remaining                     Manuals  IE    IASTM thumb A1 pulley  10' 3' 12' 12' 12'   STM   7'                        Orthosis NV Orficast circumferential to prevent comp thumb flexion 10' 2 Oval 8 orthoses 10'                                                                     Ther Ex         Thumb ROM (avoid comp flexion) Ext from table x20  RA x20  PA x20 Ext from table x20  RA x20 Ext from table x20  RA x20 Ext from table x20  RA x20 Ext from table x20    RA x20 Highlighter jumps x30  RA x30   IP blocking x20 x20 x20 x20     Thumb stretches     Abduction stretch 10 x 5\", add to HEP, review/demo Abduction stretch 10 x 5\"                                                Ther Activity                           HEP Educ on exercises, educ on avoiding composite flexion of thumb    Thumb abduction stretch                      Modalities         MHP 5' 5' 5' 5' 5' 5'   US 50% pulse, 3.3 mhz, 0.8 w/cm2  8' 8' " 8' 8' 8'

## 2024-05-30 ENCOUNTER — OFFICE VISIT (OUTPATIENT)
Dept: OCCUPATIONAL THERAPY | Facility: CLINIC | Age: 74
End: 2024-05-30
Payer: MEDICARE

## 2024-05-30 DIAGNOSIS — M79.644 PAIN OF RIGHT THUMB: Primary | ICD-10-CM

## 2024-05-30 PROCEDURE — 97035 APP MDLTY 1+ULTRASOUND EA 15: CPT

## 2024-05-30 PROCEDURE — 97140 MANUAL THERAPY 1/> REGIONS: CPT

## 2024-05-30 PROCEDURE — 97110 THERAPEUTIC EXERCISES: CPT

## 2024-05-30 NOTE — PROGRESS NOTES
"Daily Note     Today's date: 2024  Patient name: Xochitl Madrigal  : 1950  MRN: [id number]  Referring provider: Siegler, Eugenia L, MD  Dx:   Encounter Diagnosis     ICD-10-CM    1. Pain of right thumb  M79.644           Start Time: 1133  Stop Time: 1212  Total time in clinic (min): 39 minutes    Subjective: \"It's sore when you do that\" (IASTM)      Objective: See treatment diary below      Assessment: Tolerated treatment well. Patient exhibited good technique with therapeutic exercises and would benefit from continued OT. Patient continues to have crepitus and catching of thumb at A1 pulley when she does composite flexion of thumb.       Plan: Continue per plan of care.      Access Code: [email or web address]  Precautions: [place]  POC expires Unit limit Auth  expiration date PT/OT + Visit Limit?   24 na  BOMN                 Visit/Unit Tracking  AUTH Status:  Date  IE         BOMN, RE due 24 Used 1 2 3 4 5 6 7         Remaining                     Manuals    IASTM thumb A1 pulley 10' 10' 3' 12' 12' 12'   STM 5'  7'                        Orthosis  Orficast circumferential to prevent comp thumb flexion 10' 2 Oval 8 orthoses 10'                                                                     Ther Ex         Thumb ROM (avoid comp flexion) Ext from table x20  RA x20    Highlighter jumps x30 Ext from table x20  RA x20 Ext from table x20  RA x20 Ext from table x20  RA x20 Ext from table x20    RA x20 Highlighter jumps x30  RA x30   IP blocking  x20 x20 x20     Thumb stretches Abduction stretch 20 x 5\"    Abduction stretch 10 x 5\", add to HEP, review/demo Abduction stretch 10 x 5\"                                                Ther Activity                           HEP     Thumb abduction stretch                      Modalities         MHP 5' 5' 5' 5' 5' 5'   US 50% pulse, 3.3 mhz, 0.8 w/cm2 8' 8' 8' 8' 8' 8'                  "

## 2024-06-04 ENCOUNTER — APPOINTMENT (OUTPATIENT)
Dept: OCCUPATIONAL THERAPY | Facility: CLINIC | Age: 74
End: 2024-06-04
Payer: MEDICARE

## 2024-06-06 ENCOUNTER — EVALUATION (OUTPATIENT)
Dept: OCCUPATIONAL THERAPY | Facility: CLINIC | Age: 74
End: 2024-06-06
Payer: MEDICARE

## 2024-06-06 DIAGNOSIS — M79.644 PAIN OF RIGHT THUMB: Primary | ICD-10-CM

## 2024-06-06 PROCEDURE — 97110 THERAPEUTIC EXERCISES: CPT

## 2024-06-06 NOTE — LETTER
2024    Eugenia L Siegler, MD  [address] [address]    Patient: Xochitl Madrigal   YOB: 1950   Date of Visit: 2024     Encounter Diagnosis     ICD-10-CM    1. Pain of right thumb  M79.644           Dear Dr. Siegler:    Thank you for your recent referral of Xochitl Madrigal. Please review the attached evaluation summary from Xochitl's recent visit.     Please verify that you agree with the plan of care by signing the attached order.     If you have any questions or concerns, please do not hesitate to call.     I sincerely appreciate the opportunity to share in the care of one of your patients and hope to have another opportunity to work with you in the near future.     Sincerely,    Elpidio Costa, OT      Referring Provider:     I certify that I have read the below Plan of Care and certify the need for these services furnished under this plan of treatment while under my care.                    Eugenia L Siegler, MD  [address] [address]  Via Mail        OT Re-Evaluation    Today's date: 2024  Patient name: Xochitl Madrigal  : 1950  MRN: [id number]  Referring provider: Siegler, Eugenia L, MD  Dx:   Encounter Diagnosis     ICD-10-CM    1. Pain of right thumb  M79.644             Start Time: 1118  Stop Time: 1158  Total time in clinic (min): 40 minutes    Assessment  Impairments: abnormal muscle firing, activity intolerance, lacks appropriate home exercise program and pain with function  Other impairment: Active triggering/locking of R thumb  Functional limitations: Lifting, grasping  Symptom irritability: moderate    Assessment details: Xochitl Madrigal is a 74 y.o., Right HD female referred to hand therapy for R thumb pain.  Onset of pain and symptoms 2-3 months ago due to unknown etiology.  Patient presents 24 with impaired ROM, strength, and coordination of the R thumb and hand.  Deficits also noted in pain and functional use of  the right hand. Patient has palpable nodule at her A1 pulley of her R thumb that is tender to palpation. Active triggering of R thumb noted with composite flexion. Provided patient with HEP focused on thumb extension/abduction and IP blocking. Patient is a good candidate for OT services to decrease pain and triggering and restore ROM, strength, and coordination for a return to independence in daily tasks.     6/6/24: Patient seen 8 times in OT and has demonstrated improvements in overall pain levels, frequency of triggering episodes, and  and pinch strength. Patient continues to be limited in daily activities  due to thumb pain and triggering with composite flexion of thumb. Patient was re-educated on importance of wearing oval 8 orthosis to rest thumb and prevent further aggravation of symptoms. Will continue OT 2x/week for 4 weeks to improve pain and decrease triggering episodes to maximize function and independence with daily activities.   Understanding of Dx/Px/POC: excellent     Prognosis: good    Goals  STGs (4 weeks)  Patient will be independent in implementing HEP prescribed by therapist MET  Patient will report an average pain level of 4/10 with ADLs MET  Patient will demonstrate 10 degree improvement in GILMORE of the R thumb for improved use of hand in household chores MET  Decrease triggering by 50% at R thumb MET  LTGs (8 weeks)  Patient will demonstrate independence in a HEP to maintain ROM, strength, and function at discharge NOT MET  Patient will report an average pain level of 1-2/10 to be independent in daily tasks PROGRESSING  Patient will demonstrate GILMORE of the thumb to WFLs to be independent in self care tasks PROGRESSING  Patient will achieve goals as demonstrated by FOTO results PROGRESSING  Patient will demonstrate resolution of triggering of R thumb PROGRESSING    Plan  Patient would benefit from: custom splinting, orthotics and skilled occupational therapy  Planned modality interventions:  "thermotherapy: hydrocollator packs, thermotherapy: paraffin bath, ultrasound and cryotherapy    Planned therapy interventions: IASTM, joint mobilization, kinesiology taping, manual therapy, massage, neuromuscular re-education, orthotic fitting/training, orthotic management and training, patient education, strengthening, stretching, therapeutic activities, therapeutic exercise, home exercise program, graded exercise, graded activity, functional ROM exercises and flexibility    Frequency: 2x week  Duration in weeks: 8  Plan of Care beginning date: 2024  Plan of Care expiration date: 2024  Treatment plan discussed with: patient  Plan details: Continue OT 2x/week for 4 weeks        Subjective Evaluation    History of Present Illness  Mechanism of injury: Xochitl Madrigal is a 74 y.o. female presenting to OT with pain at her thumb specifically in her IP joint. Onset of symptoms 2-3 months due to unknown etiology. Pain is at its worst with movement and general activity. Patient experiencing functional deficits in all daily activities due to pain and inability to fully flex thumb. She notices locking and her thumb getting stuck when she bends it.    24: Patient reports she notices progress and improvement in the past month. Reports her thumb is not locking as much and it is not painful when it locks. She has been compliant with wearing Oval 8 orthosis to prevent composite thumb flexion but reports she recently lost this.  Patient Goals  Patient goals for therapy: increased motion and decreased pain  Patient goal: \"Use it without fear of pain\"  Pain  Current pain ratin  At best pain ratin  At worst pain ratin  Location: R thumb MP joint, A1 pulley  Quality: sharp (sharp when her thumb locks)  Aggravating factors: lifting (general activity)  Progression: improved    Social Support  Lives in: multiple-level home  Lives with: spouse    Employment status: not working  Hand dominance: " right      Diagnostic Tests  No diagnostic tests performed  Treatments  Current treatment: occupational therapy        Objective     Observations     Right Wrist/Hand   Positive for trigger finger.     Additional Observation Details  R thumb triggering    Tenderness     Right Wrist/Hand   Tenderness in the MCP joint. No tenderness in the DIP joint.     Additional Tenderness Details  A1 pulley of R thumb  6/6/24: No tenderness noted at IP joint of thumb     Neurological Testing     Additional Neurological Details  Patient denies any N/T in digits    Active Range of Motion     Right Wrist   Wrist flexion: WFL  Wrist extension: WFl  Radial deviation: WFL  Ulnar deviation: WFL    Left Thumb   Flexion     MP: 65 degrees    DIP: 65 degrees      Right Thumb   Flexion     MP: 55    DIP: 45  Palmar Abduction    CMC: 39  Radial Abduction    CMC: 39  Opposition: 6/6/24: Thumb motion grossly WFLs      Additional Active Range of Motion Details  Digits 2-5 grossly WFLs    Strength/Myotome Testing     Left Wrist/Hand      (2nd hand position)     Trial 1: 45    Thumb Strength  Key/Lateral Pinch     Trial 1: 15  Tip/Two-Point Pinch     Trial 1: 10  Palmar/Three-Point Pinch     Trial 1: 13    Right Wrist/Hand      (2nd hand position)     Trial 1: 35    Thumb Strength   Key/Lateral Pinch     Trial 1: 10  Tip/Two-Point Pinch     Trial 1: 8  Palmar/Three-Point Pinch     Trial 1: 10    Additional Strength Details  6/6/24: R  improved 15 lbs, key improved 2 lbs, 2 pt improved 1 lb, 3 pt improved 1 lb    Tests     Right Wrist/Hand   Negative CMC grind, CMC lever test positive and CMC shoulder sign.              Access Code: [email or web address]  Precautions: [place]  POC expires Unit limit Auth  expiration date PT/OT + Visit Limit?   7/2/24 na   BOMN                          Visit/Unit Tracking  AUTH Status:  Date 5/7 IE 5/14 5/16 5/21 5/23 5/28 5/30 6/6           BOMN, RE due 6/7/24 Used 1 2 3 4 5 6 7  8 RE, F             Remaining      "                             Manuals 5/30 6/6 5/16 5/21 5/23 5/28   IASTM thumb A1 pulley 10'  3' 12' 12' 12'   STM 5'  7'                                       Orthosis   Oval 8 orthosis - fabricate and issue new one 2 Oval 8 orthoses 10'                                                                                                                  Ther Ex              Thumb ROM (avoid comp flexion) Ext from table x20  RA x20     Highlighter jumps x30 Ext from table x20  RA x20    Highligter jumps x20 Ext from table x20  RA x20 Ext from table x20  RA x20 Ext from table x20     RA x20 Highlighter jumps x30  RA x30   IP blocking    x20 x20       Thumb stretches Abduction stretch 20 x 5\" Abduction stretch 20 x 5\"     Abduction stretch 10 x 5\", add to HEP, review/demo Abduction stretch 10 x 5\"                   Re-Evaluation   BS performed 30'                                                        Ther Activity                                            HEP        Thumb abduction stretch                                   Modalities              MHP 5' 5' 5' 5' 5' 5'   US 50% pulse, 3.3 mhz, 0.8 w/cm2 8'  8' 8' 8' 8'                "

## 2024-06-06 NOTE — PROGRESS NOTES
OT Re-Evaluation    Today's date: 2024  Patient name: Xochitl Madrigal  : 1950  MRN: [id number]  Referring provider: Siegler, Eugenia L, MD  Dx:   Encounter Diagnosis     ICD-10-CM    1. Pain of right thumb  M79.644             Start Time: 1118  Stop Time: 1158  Total time in clinic (min): 40 minutes    Assessment  Impairments: abnormal muscle firing, activity intolerance, lacks appropriate home exercise program and pain with function  Other impairment: Active triggering/locking of R thumb  Functional limitations: Lifting, grasping  Symptom irritability: moderate    Assessment details: Xochitl Madrigal is a 74 y.o., Right HD female referred to hand therapy for R thumb pain.  Onset of pain and symptoms 2-3 months ago due to unknown etiology.  Patient presents 24 with impaired ROM, strength, and coordination of the R thumb and hand.  Deficits also noted in pain and functional use of the right hand. Patient has palpable nodule at her A1 pulley of her R thumb that is tender to palpation. Active triggering of R thumb noted with composite flexion. Provided patient with HEP focused on thumb extension/abduction and IP blocking. Patient is a good candidate for OT services to decrease pain and triggering and restore ROM, strength, and coordination for a return to independence in daily tasks.     24: Patient seen 8 times in OT and has demonstrated improvements in overall pain levels, frequency of triggering episodes, and  and pinch strength. Patient continues to be limited in daily activities  due to thumb pain and triggering with composite flexion of thumb. Patient was re-educated on importance of wearing oval 8 orthosis to rest thumb and prevent further aggravation of symptoms. Will continue OT 2x/week for 4 weeks to improve pain and decrease triggering episodes to maximize function and independence with daily activities.   Understanding of Dx/Px/POC: excellent     Prognosis:  good    Goals  STGs (4 weeks)  Patient will be independent in implementing HEP prescribed by therapist MET  Patient will report an average pain level of 4/10 with ADLs MET  Patient will demonstrate 10 degree improvement in GILMORE of the R thumb for improved use of hand in household chores MET  Decrease triggering by 50% at R thumb MET  LTGs (8 weeks)  Patient will demonstrate independence in a HEP to maintain ROM, strength, and function at discharge NOT MET  Patient will report an average pain level of 1-2/10 to be independent in daily tasks PROGRESSING  Patient will demonstrate GILMORE of the thumb to WFLs to be independent in self care tasks PROGRESSING  Patient will achieve goals as demonstrated by FOTO results PROGRESSING  Patient will demonstrate resolution of triggering of R thumb PROGRESSING    Plan  Patient would benefit from: custom splinting, orthotics and skilled occupational therapy  Planned modality interventions: thermotherapy: hydrocollator packs, thermotherapy: paraffin bath, ultrasound and cryotherapy    Planned therapy interventions: IASTM, joint mobilization, kinesiology taping, manual therapy, massage, neuromuscular re-education, orthotic fitting/training, orthotic management and training, patient education, strengthening, stretching, therapeutic activities, therapeutic exercise, home exercise program, graded exercise, graded activity, functional ROM exercises and flexibility    Frequency: 2x week  Duration in weeks: 8  Plan of Care beginning date: 5/7/2024  Plan of Care expiration date: 7/2/2024  Treatment plan discussed with: patient  Plan details: Continue OT 2x/week for 4 weeks        Subjective Evaluation    History of Present Illness  Mechanism of injury: Xochitl Madrigal is a 74 y.o. female presenting to OT with pain at her thumb specifically in her IP joint. Onset of symptoms 2-3 months due to unknown etiology. Pain is at its worst with movement and general activity. Patient experiencing  "functional deficits in all daily activities due to pain and inability to fully flex thumb. She notices locking and her thumb getting stuck when she bends it.    24: Patient reports she notices progress and improvement in the past month. Reports her thumb is not locking as much and it is not painful when it locks. She has been compliant with wearing Oval 8 orthosis to prevent composite thumb flexion but reports she recently lost this.  Patient Goals  Patient goals for therapy: increased motion and decreased pain  Patient goal: \"Use it without fear of pain\"  Pain  Current pain ratin  At best pain ratin  At worst pain ratin  Location: R thumb MP joint, A1 pulley  Quality: sharp (sharp when her thumb locks)  Aggravating factors: lifting (general activity)  Progression: improved    Social Support  Lives in: multiple-level home  Lives with: spouse    Employment status: not working  Hand dominance: right      Diagnostic Tests  No diagnostic tests performed  Treatments  Current treatment: occupational therapy        Objective     Observations     Right Wrist/Hand   Positive for trigger finger.     Additional Observation Details  R thumb triggering    Tenderness     Right Wrist/Hand   Tenderness in the MCP joint. No tenderness in the DIP joint.     Additional Tenderness Details  A1 pulley of R thumb  24: No tenderness noted at IP joint of thumb     Neurological Testing     Additional Neurological Details  Patient denies any N/T in digits    Active Range of Motion     Right Wrist   Wrist flexion: WFL  Wrist extension: WFl  Radial deviation: WFL  Ulnar deviation: WFL    Left Thumb   Flexion     MP: 65 degrees    DIP: 65 degrees      Right Thumb   Flexion     MP: 55    DIP: 45  Palmar Abduction    CMC: 39  Radial Abduction    CMC: 39  Opposition: 24: Thumb motion grossly WFLs      Additional Active Range of Motion Details  Digits 2-5 grossly WFLs    Strength/Myotome Testing     Left Wrist/Hand      " "(2nd hand position)     Trial 1: 45    Thumb Strength  Key/Lateral Pinch     Trial 1: 15  Tip/Two-Point Pinch     Trial 1: 10  Palmar/Three-Point Pinch     Trial 1: 13    Right Wrist/Hand      (2nd hand position)     Trial 1: 35    Thumb Strength   Key/Lateral Pinch     Trial 1: 10  Tip/Two-Point Pinch     Trial 1: 8  Palmar/Three-Point Pinch     Trial 1: 10    Additional Strength Details  6/6/24: R  improved 15 lbs, key improved 2 lbs, 2 pt improved 1 lb, 3 pt improved 1 lb    Tests     Right Wrist/Hand   Negative CMC grind, CMC lever test positive and CMC shoulder sign.              Access Code: [email or web address]  Precautions: [place]  POC expires Unit limit Auth  expiration date PT/OT + Visit Limit?   7/2/24 na   BOMN                          Visit/Unit Tracking  AUTH Status:  Date 5/7 IE 5/14 5/16 5/21 5/23 5/28 5/30 6/6           BOMN, RE due 6/7/24 Used 1 2 3 4 5 6 7  8 RE, F             Remaining                                  Manuals 5/30 6/6 5/16 5/21 5/23 5/28   IASTM thumb A1 pulley 10'  3' 12' 12' 12'   STM 5'  7'                                       Orthosis   Oval 8 orthosis - fabricate and issue new one 2 Oval 8 orthoses 10'                                                                                                                  Ther Ex              Thumb ROM (avoid comp flexion) Ext from table x20  RA x20     Highlighter jumps x30 Ext from table x20  RA x20    Highligter jumps x20 Ext from table x20  RA x20 Ext from table x20  RA x20 Ext from table x20     RA x20 Highlighter jumps x30  RA x30   IP blocking    x20 x20       Thumb stretches Abduction stretch 20 x 5\" Abduction stretch 20 x 5\"     Abduction stretch 10 x 5\", add to HEP, review/demo Abduction stretch 10 x 5\"                   Re-Evaluation   BS performed 30'                                                        Ther Activity                                            HEP        Thumb abduction stretch                           "         Modalities              [facility] 5' 5' 5' 5' 5' 5'   US 50% pulse, 3.3 mhz, 0.8 w/cm2 8'  8' 8' 8' 8'             Principal Discharge DX:	Paresthesia

## 2024-06-07 ENCOUNTER — OFFICE VISIT (OUTPATIENT)
Dept: OCCUPATIONAL THERAPY | Facility: CLINIC | Age: 74
End: 2024-06-07
Payer: MEDICARE

## 2024-06-07 DIAGNOSIS — M79.644 PAIN OF RIGHT THUMB: Primary | ICD-10-CM

## 2024-06-07 PROCEDURE — 97110 THERAPEUTIC EXERCISES: CPT

## 2024-06-07 PROCEDURE — 97140 MANUAL THERAPY 1/> REGIONS: CPT

## 2024-06-07 PROCEDURE — 97035 APP MDLTY 1+ULTRASOUND EA 15: CPT

## 2024-06-07 NOTE — PROGRESS NOTES
"Daily Note     Today's date: 2024  Patient name: Xochitl Madrigal  : 1950  MRN: [id number]  Referring provider: Siegler, Eugenia L, MD  Dx:   Encounter Diagnosis     ICD-10-CM    1. Pain of right thumb  M79.644                      Subjective: Today is a good day      Objective: See treatment diary below      Assessment: Tolerated treatment well. Patient exhibited good technique with therapeutic exercises. Nodule still noted at MP flexion crease and triggering noted with composite flexion. Patient compliant with figure 8 splint. May benefit from night time composite extension splint for MP and IP joints      Plan: Continue per plan of care.      Access Code: [email or web address]  Precautions: [place]  POC expires Unit limit Auth  expiration date PT/OT + Visit Limit?   24 na   BOMN                          Visit/Unit Tracking  AUTH Status:  Date  IE          BOMN, RE due 24 Used 1 2 3 4 5 6 7  8 RE, F  9           Remaining                                  Manuals  6   IASTM thumb A1 pulley 10'  10' 12' 12' 12'   STM 5'  3''                                       Orthosis   Oval 8 orthosis - fabricate and issue new one 2 Oval 8 orthoses 10'                                                                                                                  Ther Ex              Thumb ROM (avoid comp flexion) Ext from table x20  RA x20     Highlighter jumps x30 Ext from table x20  RA x20    Highligter jumps x20 Ext from table 5\"x20;  RA 5\" x20 Ext from table x20  RA x20 Ext from table x20     RA x20 Highlighter jumps x30  RA x30   IP blocking     x20 x20       Thumb stretches Abduction stretch 20 x 5\" Abduction stretch 20 x 5\"  Abd 5\" x 20   Abduction stretch 10 x 5\", add to HEP, review/demo Abduction stretch 10 x 5\"                   Re-Evaluation   BS performed 30'                                                        Ther Activity            "                                 HEP        Thumb abduction stretch                                   Modalities              P 5' 5' 5' 5' 5' 5'   US 50% pulse, 3.3 mhz, 0.8 w/cm2 8'  8' 8' 8' 8'

## 2024-06-10 ENCOUNTER — OFFICE VISIT (OUTPATIENT)
Dept: OCCUPATIONAL THERAPY | Facility: CLINIC | Age: 74
End: 2024-06-10
Payer: MEDICARE

## 2024-06-10 DIAGNOSIS — M79.644 PAIN OF RIGHT THUMB: Primary | ICD-10-CM

## 2024-06-10 PROCEDURE — 97140 MANUAL THERAPY 1/> REGIONS: CPT

## 2024-06-10 PROCEDURE — 97110 THERAPEUTIC EXERCISES: CPT

## 2024-06-10 PROCEDURE — 97035 APP MDLTY 1+ULTRASOUND EA 15: CPT

## 2024-06-10 NOTE — PROGRESS NOTES
"Daily Note     Today's date: 6/10/2024  Patient name: Xochitl Madrigal  : 1950  MRN: [id number]  Referring provider: Siegler, Eugenia L, MD  Dx:   Encounter Diagnosis     ICD-10-CM    1. Pain of right thumb  M79.644           Start Time: 945  Stop Time: 1030  Total time in clinic (min): 45 minutes    Subjective: \"It's tender there\" (during IASTM to A1 pulley)      Objective: See treatment diary below      Assessment: Tolerated treatment well. Patient exhibited good technique with therapeutic exercises and would benefit from continued OT. Crepitus and tenderness noted at A1 pulley of R thumb during massage to area.       Plan: Continue per plan of care.      Access Code: [email or web address]  Precautions: [place]  POC expires Unit limit Auth  expiration date PT/OT + Visit Limit?   24 na   BOMN                          Visit/Unit Tracking  AUTH Status:  Date  IE 5/14 5/16 5/21 5/23 5/28 5/30  6/6  6/7  6/10       BOMN, RE due 24 Used 1 2 3 4 5 6 7  8 RE, F  9  10         Remaining                                  Manuals 5/30 6/6 6/7 6/10 5/23 5/28   IASTM thumb A1 pulley 10'  10' 10' 12' 12'   STM 5'  3''  4'                                     Orthosis   Oval 8 orthosis - fabricate and issue new one 2 Oval 8 orthoses 10'                                                                                                                  Ther Ex              Thumb ROM (avoid comp flexion) Ext from table x20  RA x20     Highlighter jumps x30 Ext from table x20  RA x20    Highligter jumps x20 Ext from table 5\"x20;  RA 5\" x20 Ext from table 20 x 5\"  RA  20 x 5\" Ext from table x20     RA x20 Highlighter jumps x30  RA x30   IP blocking     x20        Thumb stretches Abduction stretch 20 x 5\" Abduction stretch 20 x 5\"  Abd 5\" x 20  20 x 5\" Abduction stretch 10 x 5\", add to HEP, review/demo Abduction stretch 10 x 5\"                   Re-Evaluation   BS performed 30'                                                    "     Ther Activity                                            HEP        Thumb abduction stretch                                   Modalities              MHP 5' 5' 5' 5' 5' 5'   US 50% pulse, 3.3 mhz, 0.8 w/cm2 8'  8' 8' 8' 8'

## 2024-06-14 ENCOUNTER — OFFICE VISIT (OUTPATIENT)
Dept: OCCUPATIONAL THERAPY | Facility: CLINIC | Age: 74
End: 2024-06-14
Payer: MEDICARE

## 2024-06-14 DIAGNOSIS — M79.644 PAIN OF RIGHT THUMB: Primary | ICD-10-CM

## 2024-06-14 PROCEDURE — 97140 MANUAL THERAPY 1/> REGIONS: CPT

## 2024-06-14 PROCEDURE — 97035 APP MDLTY 1+ULTRASOUND EA 15: CPT

## 2024-06-14 PROCEDURE — 97110 THERAPEUTIC EXERCISES: CPT

## 2024-06-14 NOTE — PROGRESS NOTES
"Daily Note     Today's date: 2024  Patient name: Xochitl Madrigal  : 1950  MRN: [id number]  Referring provider: Siegler, Eugenia L, MD  Dx:   Encounter Diagnosis     ICD-10-CM    1. Pain of right thumb  M79.644           Start Time: 945  Stop Time: 1030  Total time in clinic (min): 45 minutes    Subjective: \"The thumb is doing greater because I've been wearing the splint more and it doesn't come off\"      Objective: See treatment diary below      Assessment: Tolerated treatment well. Patient exhibited good technique with therapeutic exercises and would benefit from continued OT. Patient continues to remain compliant with wearing splint. She has not had any triggering episodes in the past week. Continues to have tenderness/palpable nodule at A1 pulley of R thumb but her pain has improved.      Plan: Continue per plan of care.      Access Code: [email or web address]  Precautions: [place]  POC expires Unit limit Auth  expiration date PT/OT + Visit Limit?   24 na   BOMN                          Visit/Unit Tracking  AUTH Status:  Date  IE 5/14 5/16 5/21 5/23 5/28 5/30  6/6  6/7  6/10  6/14     BOMN, RE due 24 Used 1 2 3 4 5 6 7  8 RE, F  9  10  11       Remaining                                  Manuals  6/7 6/10 6/14 5/28   IASTM thumb A1 pulley 10'  10' 10' 12' 12'   STM 5'  3''  4'                                     Orthosis   Oval 8 orthosis - fabricate and issue new one 2 Oval 8 orthoses 10'                                                                                                                  Ther Ex              Thumb ROM (avoid comp flexion) Ext from table x20  RA x20     Highlighter jumps x30 Ext from table x20  RA x20    Highligter jumps x20 Ext from table 5\"x20;  RA 5\" x20 Ext from table 20 x 5\"  RA  20 x 5\" Highlighter jumps 2x30    Ext from table 20 x 5\"    RA 20 x 5\" Highlighter jumps x30  RA x30   IP blocking     x20       Thumb stretches Abduction stretch 20 x 5\" " "Abduction stretch 20 x 5\"  Abd 5\" x 20  20 x 5\" 20 x 5\" push thumb into table edge Abduction stretch 10 x 5\"                  Re-Evaluation   BS performed 30'                                                    Ther Activity                                         HEP                                           Modalities              MHP 5' 5' 5' 5' 5' 5'   US 50% pulse, 3.3 mhz, 0.9 w/cm2 8'  8' 8' 8' 8'                "

## 2024-06-17 ENCOUNTER — APPOINTMENT (OUTPATIENT)
Dept: OCCUPATIONAL THERAPY | Facility: CLINIC | Age: 74
End: 2024-06-17
Payer: MEDICARE

## 2024-06-19 ENCOUNTER — OFFICE VISIT (OUTPATIENT)
Dept: OCCUPATIONAL THERAPY | Facility: CLINIC | Age: 74
End: 2024-06-19
Payer: MEDICARE

## 2024-06-19 DIAGNOSIS — M79.644 PAIN OF RIGHT THUMB: Primary | ICD-10-CM

## 2024-06-19 PROCEDURE — 97140 MANUAL THERAPY 1/> REGIONS: CPT

## 2024-06-19 PROCEDURE — 97035 APP MDLTY 1+ULTRASOUND EA 15: CPT

## 2024-06-19 NOTE — PROGRESS NOTES
"Daily Note     Today's date: 2024  Patient name: Xochitl Madrigal  : 1950  MRN: [id number]  Referring provider: Siegler, Eugenia L, MD  Dx:   Encounter Diagnosis     ICD-10-CM    1. Pain of right thumb  M79.644                      Subjective: My right thumb is coming along      Objective: See treatment diary below      Assessment: Tolerated treatment well. Patient exhibited good technique with therapeutic exercises. No triggering noted this date      Plan: Continue per plan of care.      Access Code: [email or web address]  Precautions: [place]  POC expires Unit limit Auth  expiration date PT/OT + Visit Limit?   24 na   BOMN                          Visit/Unit Tracking  AUTH Status:  Date  IE 5/14 5/16 5/21 5/23 5/28 5/30  6/6  6/7  6/10  6/14  6/19   BOMN, RE due 24 Used 1 2 3 4 5 6 7  8 RE, F  9  10  11  12     Remaining                                  Manuals 5/30 6/6 6/7 6/10 6/14 6/19   IASTM thumb A1 pulley 10'  10' 10' 12' 12'   STM 5'  3''  4'    3'                                 Orthosis   Oval 8 orthosis - fabricate and issue new one 2 Oval 8 orthoses 10'                                                                                                                  Ther Ex              Thumb ROM (avoid comp flexion) Ext from table x20  RA x20     Highlighter jumps x30 Ext from table x20  RA x20    Highligter jumps x20 Ext from table 5\"x20;  RA 5\" x20 Ext from table 20 x 5\"  RA  20 x 5\" Highlighter jumps 2x30    Ext from table 20 x 5\"    RA 20 x 5\" Highlighter jumps x 30    Ext from table 5\" x 30    RA with thin RB 5\" x 30   IP blocking     x20    x20 with CMC and MP in full ext   Thumb stretches Abduction stretch 20 x 5\" Abduction stretch 20 x 5\"  Abd 5\" x 20  20 x 5\" 20 x 5\" push thumb into table edge 5\" x 20                  Re-Evaluation   BS performed 30'                                                    Ther Activity                                         HEP                       "                     Modalities              [facility]' [facility]' 5' 5'   US 50% pulse, 3.3 mhz, 0.9 w/cm2 8'  8' 8' 8' 8'

## 2024-06-21 ENCOUNTER — OFFICE VISIT (OUTPATIENT)
Dept: OCCUPATIONAL THERAPY | Facility: CLINIC | Age: 74
End: 2024-06-21
Payer: MEDICARE

## 2024-06-21 DIAGNOSIS — M79.644 PAIN OF RIGHT THUMB: Primary | ICD-10-CM

## 2024-06-21 PROCEDURE — 97110 THERAPEUTIC EXERCISES: CPT

## 2024-06-21 PROCEDURE — 97140 MANUAL THERAPY 1/> REGIONS: CPT

## 2024-06-21 NOTE — PROGRESS NOTES
"Daily Note     Today's date: 2024  Patient name: Xochitl Madrigal  : 1950  MRN: [id number]  Referring provider: Siegler, Eugenia L, MD  Dx:   Encounter Diagnosis     ICD-10-CM    1. Pain of right thumb  M79.644                      Subjective: I feel like my thumb is doing better      Objective: See treatment diary below      Assessment: Tolerated treatment well. Patient exhibited good technique with therapeutic exercises. Patient able to flex thumb to base of small finger 10 times without triggering. Instructed to continue with splint at all times, but remove to do opposition to small finger with slides down small finger 10 reps, 2x/day      Plan: Continue per plan of care.      Access Code: [email or web address]  Precautions: [place]  POC expires Unit limit Auth  expiration date PT/OT + Visit Limit?   24 na   BOMN                          Visit/Unit Tracking  AUTH Status:  Date  IE 5/14 5/16 5/21 5/23 5/28 5/30  6/6  6/7  6/10  6/14  6/19   BOMN, RE due 24 Used 1 2 3 4 5 6 7  8 RE, F  9  10  11  12                                  Used                      Manuals 6/21 6/6 6/7 6/10 6/14 6/19   IASTM thumb A1 pulley 12'  10' 10' 12' 12'   STM 5'  3''  4'    3'    Jt mobs thumb IP, MP  6'                           Orthosis   Oval 8 orthosis - fabricate and issue new one 2 Oval 8 orthoses 10'                                                                                                                  Ther Ex              Thumb ROM (avoid comp flexion) Ext from table x 20    Thumb opposition to all digits w/ slide down small finger x 10 Ext from table x20  RA x20    Highligter jumps x20 Ext from table 5\"x20;  RA 5\" x20 Ext from table 20 x 5\"  RA  20 x 5\" Highlighter jumps 2x30    Ext from table 20 x 5\"    RA 20 x 5\" Highlighter jumps x 30    Ext from table 5\" x 30    RA with thin RB 5\" x 30   IP blocking  x20 with CMC and MP in full ext   x20    x20 with CMC and MP in full ext   Thumb stretches " "Abduction stretch 20 x 5\" Abduction stretch 20 x 5\"  Abd 5\" x 20  20 x 5\" 20 x 5\" push thumb into table edge 5\" x 20                  Re-Evaluation   BS performed 30'                                                    Ther Activity                                         HEP                                           Modalities              MHP 5' 5' 5' 5' 5' 5'   US 50% pulse, 3.3 mhz, 0.9 w/cm2 8'  8' 8' 8' 8'                    "

## 2024-06-24 ENCOUNTER — APPOINTMENT (OUTPATIENT)
Dept: OCCUPATIONAL THERAPY | Facility: CLINIC | Age: 74
End: 2024-06-24
Payer: MEDICARE

## 2024-06-27 ENCOUNTER — APPOINTMENT (OUTPATIENT)
Dept: OCCUPATIONAL THERAPY | Facility: CLINIC | Age: 74
End: 2024-06-27
Payer: MEDICARE

## 2024-06-28 ENCOUNTER — APPOINTMENT (OUTPATIENT)
Dept: OCCUPATIONAL THERAPY | Facility: CLINIC | Age: 74
End: 2024-06-28
Payer: MEDICARE

## 2024-07-01 ENCOUNTER — APPOINTMENT (OUTPATIENT)
Dept: OCCUPATIONAL THERAPY | Facility: CLINIC | Age: 74
End: 2024-07-01
Payer: MEDICARE

## 2024-07-03 ENCOUNTER — EVALUATION (OUTPATIENT)
Dept: OCCUPATIONAL THERAPY | Facility: CLINIC | Age: 74
End: 2024-07-03
Payer: MEDICARE

## 2024-07-03 DIAGNOSIS — M79.644 PAIN OF RIGHT THUMB: Primary | ICD-10-CM

## 2024-07-03 PROCEDURE — 97110 THERAPEUTIC EXERCISES: CPT

## 2024-07-03 PROCEDURE — 97140 MANUAL THERAPY 1/> REGIONS: CPT

## 2024-07-03 NOTE — PROGRESS NOTES
OT Re-Evaluation/Discharge    Today's date: 7/3/2024  Patient name: Xochitl Madrigal  : 1950  MRN: [id number]  Referring provider: Siegler, Eugenia L, MD  Dx:   Encounter Diagnosis     ICD-10-CM    1. Pain of right thumb  M79.644               Start Time: 1005  Stop Time: 1045  Total time in clinic (min): 40 minutes    Assessment  Impairments: activity intolerance and pain with function  Functional limitations: Lifting, grasping  Symptom irritability: low    Assessment details: Xochitl Madrigal is a 74 y.o., Right HD female referred to hand therapy for R thumb pain.  Onset of pain and symptoms 2-3 months ago due to unknown etiology.  Patient presents 24 with impaired ROM, strength, and coordination of the R thumb and hand.  Deficits also noted in pain and functional use of the right hand. Patient has palpable nodule at her A1 pulley of her R thumb that is tender to palpation. Active triggering of R thumb noted with composite flexion. Provided patient with HEP focused on thumb extension/abduction and IP blocking. Patient is a good candidate for OT services to decrease pain and triggering and restore ROM, strength, and coordination for a return to independence in daily tasks.     24: Patient seen 8 times in OT and has demonstrated improvements in overall pain levels, frequency of triggering episodes, and  and pinch strength. Patient continues to be limited in daily activities  due to thumb pain and triggering with composite flexion of thumb. Patient was re-educated on importance of wearing oval 8 orthosis to rest thumb and prevent further aggravation of symptoms. Will continue OT 2x/week for 4 weeks to improve pain and decrease triggering episodes to maximize function and independence with daily activities.     7/3/24: Patient seen 14 times in OT and has demonstrated improvements in pain levels and triggering/catching of A1 pulley of thumb. Patient able to perform all daily activities with  minimal pain, triggering, or difficulty noted. She may wean from the Oval 8 orthosis over the next few weeks starting at 1 hour/3x per day increasing 1 hour each week. All goals established on IE have been met. She should continue to wear orthosis while sleeping. Patient will be discharged to HEP today.  Understanding of Dx/Px/POC: excellent     Prognosis: good    Goals  STGs (4 weeks)  Patient will be independent in implementing HEP prescribed by therapist MET  Patient will report an average pain level of 4/10 with ADLs MET  Patient will demonstrate 10 degree improvement in GILMORE of the R thumb for improved use of hand in household chores MET  Decrease triggering by 50% at R thumb MET  LTGs (8 weeks)  Patient will demonstrate independence in a HEP to maintain ROM, strength, and function at discharge MET  Patient will report an average pain level of 1-2/10 to be independent in daily tasks MET  Patient will demonstrate GILMORE of the thumb to WFLs to be independent in self care tasks MET  Patient will achieve goals as demonstrated by FOTO results MET  Patient will demonstrate resolution of triggering of R thumb MET    Plan    Planned therapy interventions: home exercise program    Plan of Care beginning date: 5/7/2024  Plan of Care expiration date: 7/2/2024  Treatment plan discussed with: patient  Plan details: Patient discharged to HEP today. Encouraged to contact office with any questions or concerns.        Subjective Evaluation    History of Present Illness  Mechanism of injury: Xochitl Madrigal is a 74 y.o. female presenting to OT with pain at her thumb specifically in her IP joint. Onset of symptoms 2-3 months due to unknown etiology. Pain is at its worst with movement and general activity. Patient experiencing functional deficits in all daily activities due to pain and inability to fully flex thumb. She notices locking and her thumb getting stuck when she bends it.    6/6/24: Patient reports she notices progress  "and improvement in the past month. Reports her thumb is not locking as much and it is not painful when it locks. She has been compliant with wearing Oval 8 orthosis to prevent composite thumb flexion but reports she recently lost this.    7/3/24: Patient reports her thumb pain continues to improve and the locking in her thumb has decreased. She forgot to wear her orthosis for one night and reports the locking increased but this has since resolved.   Patient Goals  Patient goals for therapy: increased motion and decreased pain  Patient goal: \"Use it without fear of pain\"  Pain  No pain reported  Current pain ratin  At best pain ratin  At worst pain ratin  Relieving factors: support and heat  Progression: improved    Social Support  Lives in: multiple-level home  Lives with: spouse    Employment status: not working  Hand dominance: right      Diagnostic Tests  No diagnostic tests performed  Treatments  Current treatment: occupational therapy        Objective     Observations     Right Wrist/Hand   Negative for trigger finger.     Additional Observation Details  7/3/24: No triggering noted, palpable nodule at A1 pulley present    Tenderness     Right Wrist/Hand   No tenderness in the DIP joint and MCP joint.     Additional Tenderness Details  Tenderness A1 pulley of thumb, no tenderness in IP or MP joints    Neurological Testing     Additional Neurological Details  Patient denies any N/T in digits    Active Range of Motion     Right Wrist   Wrist flexion: WFL  Wrist extension: WFl  Radial deviation: WFL  Ulnar deviation: WFL    Left Thumb   Flexion     MP: 65 degrees    DIP: 65 degrees      Right Thumb   Flexion     MP: 55    DIP: 45  Palmar Abduction    CMC: 39  Radial Abduction    CMC: 39  Opposition: 24: Thumb motion grossly WFLs      Additional Active Range of Motion Details  Digits 2-5 grossly WFLs    Strength/Myotome Testing     Left Wrist/Hand      (2nd hand position)     Trial 1: 45    Thumb " "Strength  Key/Lateral Pinch     Trial 1: 15  Tip/Two-Point Pinch     Trial 1: 10  Palmar/Three-Point Pinch     Trial 1: 13    Right Wrist/Hand      (2nd hand position)     Trial 1: 50    Thumb Strength   Key/Lateral Pinch     Trial 1: 15  Tip/Two-Point Pinch     Trial 1: 11.5  Palmar/Three-Point Pinch     Trial 1: 13    Additional Strength Details  6/6/24: R  improved 15 lbs, key improved 2 lbs, 2 pt improved 1 lb, 3 pt improved 1 lb  7/3/24: R  improved 15 lbs, key improved 5 lbs, 2 pt improved 3.5 lb, 3 pt improved 3 lbs    Tests     Right Wrist/Hand   Negative CMC grind, CMC lever test positive and CMC shoulder sign.              Access Code: [email or web address]  Precautions: [place]  POC expires Unit limit Auth  expiration date PT/OT + Visit Limit?   7/2/24 na   BOMN                          Visit/Unit Tracking  AUTH Status:  Date 5/7 IE 5/14 5/16 5/21 5/23 5/28 5/30  6/6  6/7  6/10  6/14  6/19   BOMN, RE due 6/7/24 Used 1 2 3 4 5 6 7  8 RE, F  9  10  11  12                                   Used  6/21  7/3 RE                         13 14                   Manuals 6/21 7/3 6/7 6/10 6/14 6/19   IASTM thumb A1 pulley 12'  8' 10' 10' 12' 12'   STM 5'   3''  4'    3'    Jt mobs thumb IP, MP  6'                             Orthosis    2 Oval 8 orthoses 10'                                                                                                                  Ther Ex              Thumb ROM (avoid comp flexion) Ext from table x 20     Thumb opposition to all digits w/ slide down small finger x 10  Ext from table 5\"x20;  RA 5\" x20 Ext from table 20 x 5\"  RA  20 x 5\" Highlighter jumps 2x30     Ext from table 20 x 5\"     RA 20 x 5\" Highlighter jumps x 30     Ext from table 5\" x 30     RA with thin RB 5\" x 30   IP blocking  x20 with CMC and MP in full ext   x20      x20 with CMC and MP in full ext   Thumb stretches Abduction stretch 20 x 5\"   Abd 5\" x 20  20 x 5\" 20 x 5\" push thumb into table edge 5\" x 20      "              Re-Evaluation   24' BS performed                                                           Ther Activity                                               HEP                                               Modalities               MHP 5' 5' 5' 5' 5' 5'   US 50% pulse, 3.3 mhz, 0.9 w/cm2 8'  8' 8' 8' 8' 8'

## 2025-01-28 NOTE — PROGRESS NOTES
Daily Note     Today's date: 2022  Patient name: Waleska Contreras  : 1950  MRN: [id number]  Referring provider: Tim Gottron, MD  Dx:   Encounter Diagnosis     ICD-10-CM    1  Shoulder joint stiffness, left  M25 612    2  Shoulder joint stiffness, right  M25 611                   Subjective: Patient reports that her shoulders feel sore today  No changes from her initial evaluation  Objective: See treatment diary below      Assessment: Initiated treatment session as outlined below; tolerated treatment well  Patient very challenged with shoulder ER bilaterally, demonstrating ER weakness  Patient demonstrated fatigue post treatment, exhibited good technique with therapeutic exercises and would benefit from continued PT      Plan: Continue per plan of care        Precautions: DM, HTN, Hypothyroidism   Access Code: [facility]       Manuals                                                                Neuro Re-Ed             TB rows  RTB 2x10            TB pulldowns  RTB 2x10            Bent over rows             Bent over prone Y,T,Is                                                    Ther Ex             UBE fwd/retro             Pulleys  3' flex, scapt           Finger Ladder             Wall slides flex, scapt HEP 5"x10 B/L           TB ER  RTB 2x10 B/L           TB IR  RTB 2x10   B/L           Standing shoulder 3 ways              Standing shoulder IR stretch with strap             Pt education PT POC, HEP            Ther Activity                                       Gait Training                                       Modalities Please note, the following data table should not be interpreted in isolation. Please see the corresponding full neuropsychological evaluation report by Dr. Burkett.    Psychometrist time with patient for neuropsychological testing and scorin minutes   NEUROPSYCH DATA  25          GENERAL SCREEN Raw StS1 [%ile]   MMSE -2 Form: Blue 29 106 66   Clock Drawing (/10)  10           RBANS Form: A Raw StS1 [%ile]   Immediate Memory Index  112 79    List Learning 27 11 63    Story Memory 21 13 84   Visuospatial/Constructional Index  96 39    Figure Copy 17 9 37    Line Orientation 17  51-75    Language Index  108 70    Picture Naming 10  51-75    Semantic Fluency 24 12 75   Attention Index  106 66    Digit Span 10 10 50    Coding 47 12 75   Delayed Memory Index  110     List Recall 6  51-75    List Recognition 20  51-75    Story Recall 12 15 95    Figure Recall 13 10 50   Total Score  108 70         ATTN/SPEED OF PROCESSING Raw StS1 [%ile]   COWA Form: CFL 57 126 96   Category Fluency 23 112 79   [place] Making Test Part-A 31” 103 58   [place] Making Test Part-B 58” 112 79         LANGUAGE Raw StS1 [%ile]   WTAR 50 126 96         PROBLEM SOLVING Raw StS1 [%ile]   [place] Errors 13 110 75         PERFORMANCE VALIDITY E-Score Cutoff    Dot Counting Test 6 14          PERSONALITY/MOOD Raw Rating    GDS (15) 6 Mild    GAI (20) 1 Min    [1] In order to provide comparability across tests, standard scores (mean = 100, standard deviation = 15) and age-corrected scaled scores (mean = 10, s.d. = 3) are provided where possible.

## 2025-04-15 ENCOUNTER — OFFICE VISIT (OUTPATIENT)
Dept: URGENT CARE | Facility: CLINIC | Age: 75
End: 2025-04-15
Payer: MEDICARE

## 2025-04-15 VITALS
OXYGEN SATURATION: 97 % | BODY MASS INDEX: 32.78 KG/M2 | HEART RATE: 68 BPM | TEMPERATURE: 98.4 F | RESPIRATION RATE: 16 BRPM | DIASTOLIC BLOOD PRESSURE: 62 MMHG | SYSTOLIC BLOOD PRESSURE: 124 MMHG | WEIGHT: 200 LBS

## 2025-04-15 DIAGNOSIS — Z23 ENCOUNTER FOR IMMUNIZATION: ICD-10-CM

## 2025-04-15 DIAGNOSIS — S91.332A PUNCTURE WOUND OF LEFT FOOT, INITIAL ENCOUNTER: ICD-10-CM

## 2025-04-15 DIAGNOSIS — M79.672 LEFT FOOT PAIN: Primary | ICD-10-CM

## 2025-04-15 PROCEDURE — G0463 HOSPITAL OUTPT CLINIC VISIT: HCPCS | Performed by: PHYSICAL MEDICINE & REHABILITATION

## 2025-04-15 PROCEDURE — 90715 TDAP VACCINE 7 YRS/> IM: CPT

## 2025-04-15 PROCEDURE — 99213 OFFICE O/P EST LOW 20 MIN: CPT | Performed by: PHYSICAL MEDICINE & REHABILITATION

## 2025-04-15 NOTE — PROGRESS NOTES
[facility]        NAME: Xochitl Madrigal is a 75 y.o. female  : 1950    MRN: [id number]  DATE: April 15, 2025  TIME: 1:40 PM    Assessment and Plan   Left foot pain [M79.672]  1. Left foot pain        2. Encounter for immunization  Tdap Vaccine greater than or equal to 6yo      3. Puncture wound of left foot, initial encounter  Tdap Vaccine greater than or equal to 6yo        Will cover with tdap. Last in 2018. >5 years.       Patient Instructions       Follow up with PCP in 3-5 days.  Proceed to  ER if symptoms worsen.    If tests are performed, our office will contact you with results only if changes need to made to the care plan discussed with you at the visit. You can review your full results on [facility].    Chief Complaint     Chief Complaint   Patient presents with    Foot Pain     Pt c/o left foot pain s/p stepping on nail on . No open areas seen to the bottom of foot.         History of Present Illness       Pt is a 75 year old female presenting with left foot pain after stepping on a nail on 25. TDAP last in 2018.      Review of Systems   Review of Systems   Constitutional: Negative.    Respiratory: Negative.     Cardiovascular: Negative.    Musculoskeletal:  Positive for myalgias.   Skin: Negative.          Current Medications       Current Outpatient Medications:     amLODIPine (NORVASC) 10 mg tablet, Take 10 mg by mouth daily, Disp: , Rfl:     aspirin 81 mg chewable tablet, Chew 81 mg, Disp: , Rfl:     atorvastatin (LIPITOR) 20 mg tablet, TAKE 1 TABLET BY MOUTH AT BEDTIME FOR HIGH AMOUNT OF FATS IN THE BLOOD, Disp: , Rfl:     chlorthalidone 25 mg tablet, Take 25 mg by mouth daily, Disp: , Rfl:     Diclofenac Sodium (VOLTAREN) 1 %, Apply 2 g topically 3 (three) times a day, Disp: 150 g, Rfl: 2    Farxiga 10 MG tablet, TAKE 1 TABLET (10 MG) BY MOUTH DAILY., Disp: , Rfl:     glucose blood test strip, FOR ONCE DAILY BG TESTING, DX E11.9 TYPE 2 DM INDICATIONS: TYPE 2  DM, Disp: , Rfl:     hydrALAZINE (APRESOLINE) 25 mg tablet, TAKE 2 TABLETS BY MOUTH EVERY MORNING AND TAKE 1 TABLET EVERY EVENING, Disp: , Rfl:     latanoprost (XALATAN) 0.005 % ophthalmic solution, instill 1 drop into both eyes at bedtime, Disp: , Rfl:     levothyroxine 100 mcg tablet, Take 100 mcg by mouth daily, Disp: , Rfl:     losartan (COZAAR) 25 mg tablet, Take 25 mg by mouth daily, Disp: , Rfl:     metFORMIN (GLUCOPHAGE) 1000 MG tablet, TAKE 1 TABLET BY MOUTH 2 TIMES A DAY WITH MEALS. INDICATIONS: DIABETES, Disp: , Rfl:     metoprolol succinate (TOPROL-XL) 25 mg 24 hr tablet, TAKE 1 TABLET BY MOUTH EVERY DAY FOR HIGH BLOOD PRESSURE, Disp: , Rfl:     repaglinide (PRANDIN) 1 mg tablet, , Disp: , Rfl:     spironolactone (ALDACTONE) 25 mg tablet, , Disp: , Rfl:     Tradjenta 5 MG TABS, Take 5 mg by mouth daily, Disp: , Rfl:     Current Allergies     Allergies as of 04/15/2025    (No Known Allergies)            The following portions of the patient's history were reviewed and updated as appropriate: allergies, current medications, past family history, past medical history, past social history, past surgical history and problem list.     Past Medical History:   Diagnosis Date    Diabetes mellitus (HCC)     Hypertension        History reviewed. No pertinent surgical history.    History reviewed. No pertinent family history.      Medications have been verified.        Objective   /62   Pulse 68   Temp 98.4 °F (36.9 °C) (Temporal)   Resp 16   Wt 90.7 kg (200 lb)   SpO2 97%   BMI 32.78 kg/m²        Physical Exam     Physical Exam  Vitals reviewed.   Cardiovascular:      Rate and Rhythm: Normal rate and regular rhythm.      Pulses: Normal pulses.      Heart sounds: Normal heart sounds.   Pulmonary:      Effort: Pulmonary effort is normal.      Breath sounds: Normal breath sounds.   Musculoskeletal:         General: Tenderness present.   Skin:     Comments: Small ecchymosis to bottom of left foot, suspected  area of possible nail exposure

## 2025-04-25 ENCOUNTER — APPOINTMENT (EMERGENCY)
Dept: CT IMAGING | Facility: HOSPITAL | Age: 75
End: 2025-04-25
Payer: MEDICARE

## 2025-04-25 ENCOUNTER — HOSPITAL ENCOUNTER (INPATIENT)
Facility: HOSPITAL | Age: 75
LOS: 2 days | Discharge: HOME | End: 2025-04-27
Attending: EMERGENCY MEDICINE | Admitting: FAMILY MEDICINE
Payer: MEDICARE

## 2025-04-25 DIAGNOSIS — E87.1 HYPONATREMIA: Primary | ICD-10-CM

## 2025-04-25 DIAGNOSIS — E11.9 TYPE 2 DIABETES MELLITUS WITHOUT COMPLICATION, WITHOUT LONG-TERM CURRENT USE OF INSULIN (HCC): ICD-10-CM

## 2025-04-25 DIAGNOSIS — R42 DIZZINESS: ICD-10-CM

## 2025-04-25 PROBLEM — I10 HYPERTENSION: Status: ACTIVE | Noted: 2025-04-25

## 2025-04-25 PROBLEM — N17.9 AKI (ACUTE KIDNEY INJURY) (HCC): Status: ACTIVE | Noted: 2025-04-25

## 2025-04-25 LAB
ANION GAP SERPL CALCULATED.3IONS-SCNC: 8 MMOL/L (ref 4–13)
ANION GAP SERPL CALCULATED.3IONS-SCNC: 9 MMOL/L (ref 4–13)
ATRIAL RATE: 58 BPM
BACTERIA UR QL AUTO: NORMAL /HPF
BASOPHILS # BLD AUTO: 0.02 THOUSANDS/ÂΜL (ref 0–0.1)
BASOPHILS NFR BLD AUTO: 0 % (ref 0–1)
BILIRUB UR QL STRIP: NEGATIVE
BUN SERPL-MCNC: 16 MG/DL (ref 5–25)
BUN SERPL-MCNC: 17 MG/DL (ref 5–25)
CALCIUM SERPL-MCNC: 10 MG/DL (ref 8.4–10.2)
CALCIUM SERPL-MCNC: 9.9 MG/DL (ref 8.4–10.2)
CHLORIDE SERPL-SCNC: 89 MMOL/L (ref 96–108)
CHLORIDE SERPL-SCNC: 91 MMOL/L (ref 96–108)
CLARITY UR: CLEAR
CO2 SERPL-SCNC: 24 MMOL/L (ref 21–32)
CO2 SERPL-SCNC: 25 MMOL/L (ref 21–32)
COLOR UR: COLORLESS
CREAT SERPL-MCNC: 1.43 MG/DL (ref 0.6–1.3)
CREAT SERPL-MCNC: 1.44 MG/DL (ref 0.6–1.3)
CREAT UR-MCNC: 31 MG/DL
EOSINOPHIL # BLD AUTO: 0.05 THOUSAND/ÂΜL (ref 0–0.61)
EOSINOPHIL NFR BLD AUTO: 1 % (ref 0–6)
ERYTHROCYTE [DISTWIDTH] IN BLOOD BY AUTOMATED COUNT: 11.7 % (ref 11.6–15.1)
GFR SERPL CREATININE-BSD FRML MDRD: 35 ML/MIN/1.73SQ M
GFR SERPL CREATININE-BSD FRML MDRD: 35 ML/MIN/1.73SQ M
GLUCOSE SERPL-MCNC: 215 MG/DL (ref 65–140)
GLUCOSE SERPL-MCNC: 217 MG/DL (ref 65–140)
GLUCOSE SERPL-MCNC: 220 MG/DL (ref 65–140)
GLUCOSE UR STRIP-MCNC: ABNORMAL MG/DL
HCT VFR BLD AUTO: 34.5 % (ref 34.8–46.1)
HGB BLD-MCNC: 11.5 G/DL (ref 11.5–15.4)
HGB UR QL STRIP.AUTO: NEGATIVE
IMM GRANULOCYTES # BLD AUTO: 0.05 THOUSAND/UL (ref 0–0.2)
IMM GRANULOCYTES NFR BLD AUTO: 1 % (ref 0–2)
KETONES UR STRIP-MCNC: NEGATIVE MG/DL
LEUKOCYTE ESTERASE UR QL STRIP: ABNORMAL
LYMPHOCYTES # BLD AUTO: 1.04 THOUSANDS/ÂΜL (ref 0.6–4.47)
LYMPHOCYTES NFR BLD AUTO: 23 % (ref 14–44)
MCH RBC QN AUTO: 28.4 PG (ref 26.8–34.3)
MCHC RBC AUTO-ENTMCNC: 33.3 G/DL (ref 31.4–37.4)
MCV RBC AUTO: 85 FL (ref 82–98)
MONOCYTES # BLD AUTO: 0.54 THOUSAND/ÂΜL (ref 0.17–1.22)
MONOCYTES NFR BLD AUTO: 12 % (ref 4–12)
NEUTROPHILS # BLD AUTO: 2.9 THOUSANDS/ÂΜL (ref 1.85–7.62)
NEUTS SEG NFR BLD AUTO: 63 % (ref 43–75)
NITRITE UR QL STRIP: NEGATIVE
NON-SQ EPI CELLS URNS QL MICRO: NORMAL /HPF
NRBC BLD AUTO-RTO: 0 /100 WBCS
P AXIS: 78 DEGREES
PH UR STRIP.AUTO: 6.5 [PH]
PLATELET # BLD AUTO: 278 THOUSANDS/UL (ref 149–390)
PLATELET # BLD AUTO: 313 THOUSANDS/UL (ref 149–390)
PMV BLD AUTO: 9 FL (ref 8.9–12.7)
PMV BLD AUTO: 9.5 FL (ref 8.9–12.7)
POTASSIUM SERPL-SCNC: 4 MMOL/L (ref 3.5–5.3)
POTASSIUM SERPL-SCNC: 4.2 MMOL/L (ref 3.5–5.3)
PR INTERVAL: 190 MS
PROT UR STRIP-MCNC: NEGATIVE MG/DL
QRS AXIS: 49 DEGREES
QRSD INTERVAL: 94 MS
QT INTERVAL: 404 MS
QTC INTERVAL: 396 MS
RBC # BLD AUTO: 4.05 MILLION/UL (ref 3.81–5.12)
RBC #/AREA URNS AUTO: NORMAL /HPF
SODIUM SERPL-SCNC: 122 MMOL/L (ref 135–147)
SODIUM SERPL-SCNC: 124 MMOL/L (ref 135–147)
SODIUM UR-SCNC: 41 MMOL/L
SP GR UR STRIP.AUTO: 1.01 (ref 1–1.03)
T WAVE AXIS: 163 DEGREES
UROBILINOGEN UR STRIP-ACNC: <2 MG/DL
VENTRICULAR RATE: 58 BPM
WBC # BLD AUTO: 4.6 THOUSAND/UL (ref 4.31–10.16)
WBC #/AREA URNS AUTO: NORMAL /HPF

## 2025-04-25 PROCEDURE — 93010 ELECTROCARDIOGRAM REPORT: CPT | Performed by: INTERNAL MEDICINE

## 2025-04-25 PROCEDURE — 85049 AUTOMATED PLATELET COUNT: CPT | Performed by: FAMILY MEDICINE

## 2025-04-25 PROCEDURE — 96374 THER/PROPH/DIAG INJ IV PUSH: CPT

## 2025-04-25 PROCEDURE — 85025 COMPLETE CBC W/AUTO DIFF WBC: CPT | Performed by: EMERGENCY MEDICINE

## 2025-04-25 PROCEDURE — 82948 REAGENT STRIP/BLOOD GLUCOSE: CPT

## 2025-04-25 PROCEDURE — 70496 CT ANGIOGRAPHY HEAD: CPT

## 2025-04-25 PROCEDURE — 83930 ASSAY OF BLOOD OSMOLALITY: CPT | Performed by: EMERGENCY MEDICINE

## 2025-04-25 PROCEDURE — 80048 BASIC METABOLIC PNL TOTAL CA: CPT | Performed by: FAMILY MEDICINE

## 2025-04-25 PROCEDURE — 99285 EMERGENCY DEPT VISIT HI MDM: CPT | Performed by: EMERGENCY MEDICINE

## 2025-04-25 PROCEDURE — 36415 COLL VENOUS BLD VENIPUNCTURE: CPT | Performed by: EMERGENCY MEDICINE

## 2025-04-25 PROCEDURE — 80048 BASIC METABOLIC PNL TOTAL CA: CPT | Performed by: EMERGENCY MEDICINE

## 2025-04-25 PROCEDURE — 82570 ASSAY OF URINE CREATININE: CPT | Performed by: EMERGENCY MEDICINE

## 2025-04-25 PROCEDURE — 84300 ASSAY OF URINE SODIUM: CPT | Performed by: EMERGENCY MEDICINE

## 2025-04-25 PROCEDURE — 99285 EMERGENCY DEPT VISIT HI MDM: CPT

## 2025-04-25 PROCEDURE — 83036 HEMOGLOBIN GLYCOSYLATED A1C: CPT | Performed by: FAMILY MEDICINE

## 2025-04-25 PROCEDURE — 96361 HYDRATE IV INFUSION ADD-ON: CPT

## 2025-04-25 PROCEDURE — 93005 ELECTROCARDIOGRAM TRACING: CPT

## 2025-04-25 PROCEDURE — 81001 URINALYSIS AUTO W/SCOPE: CPT | Performed by: EMERGENCY MEDICINE

## 2025-04-25 PROCEDURE — 83935 ASSAY OF URINE OSMOLALITY: CPT | Performed by: EMERGENCY MEDICINE

## 2025-04-25 PROCEDURE — 99223 1ST HOSP IP/OBS HIGH 75: CPT | Performed by: FAMILY MEDICINE

## 2025-04-25 PROCEDURE — 70498 CT ANGIOGRAPHY NECK: CPT

## 2025-04-25 RX ORDER — SODIUM CHLORIDE 1 G/1
2 TABLET ORAL
Status: DISCONTINUED | OUTPATIENT
Start: 2025-04-25 | End: 2025-04-26

## 2025-04-25 RX ORDER — AMLODIPINE BESYLATE 10 MG/1
10 TABLET ORAL DAILY
Status: DISCONTINUED | OUTPATIENT
Start: 2025-04-26 | End: 2025-04-25

## 2025-04-25 RX ORDER — LEVOTHYROXINE SODIUM 100 UG/1
100 TABLET ORAL DAILY
Status: DISCONTINUED | OUTPATIENT
Start: 2025-04-26 | End: 2025-04-27 | Stop reason: HOSPADM

## 2025-04-25 RX ORDER — AMLODIPINE BESYLATE 10 MG/1
10 TABLET ORAL DAILY
Status: DISCONTINUED | OUTPATIENT
Start: 2025-04-25 | End: 2025-04-27 | Stop reason: HOSPADM

## 2025-04-25 RX ORDER — HYDRALAZINE HYDROCHLORIDE 20 MG/ML
5 INJECTION INTRAMUSCULAR; INTRAVENOUS EVERY 6 HOURS PRN
Status: DISCONTINUED | OUTPATIENT
Start: 2025-04-25 | End: 2025-04-27 | Stop reason: HOSPADM

## 2025-04-25 RX ORDER — METOPROLOL SUCCINATE 25 MG/1
25 TABLET, EXTENDED RELEASE ORAL DAILY
Status: DISCONTINUED | OUTPATIENT
Start: 2025-04-26 | End: 2025-04-27 | Stop reason: HOSPADM

## 2025-04-25 RX ORDER — INSULIN LISPRO 100 [IU]/ML
1-6 INJECTION, SOLUTION INTRAVENOUS; SUBCUTANEOUS
Status: DISCONTINUED | OUTPATIENT
Start: 2025-04-25 | End: 2025-04-27 | Stop reason: HOSPADM

## 2025-04-25 RX ORDER — ATORVASTATIN CALCIUM 10 MG/1
20 TABLET, FILM COATED ORAL
Status: DISCONTINUED | OUTPATIENT
Start: 2025-04-25 | End: 2025-04-27 | Stop reason: HOSPADM

## 2025-04-25 RX ORDER — HEPARIN SODIUM 5000 [USP'U]/ML
5000 INJECTION, SOLUTION INTRAVENOUS; SUBCUTANEOUS EVERY 8 HOURS SCHEDULED
Status: DISCONTINUED | OUTPATIENT
Start: 2025-04-25 | End: 2025-04-27 | Stop reason: HOSPADM

## 2025-04-25 RX ORDER — ACETAMINOPHEN 325 MG/1
650 TABLET ORAL EVERY 6 HOURS PRN
Status: DISCONTINUED | OUTPATIENT
Start: 2025-04-25 | End: 2025-04-27 | Stop reason: HOSPADM

## 2025-04-25 RX ORDER — ASPIRIN 81 MG/1
81 TABLET, CHEWABLE ORAL DAILY
Status: DISCONTINUED | OUTPATIENT
Start: 2025-04-26 | End: 2025-04-27 | Stop reason: HOSPADM

## 2025-04-25 RX ORDER — METOCLOPRAMIDE HYDROCHLORIDE 5 MG/ML
10 INJECTION INTRAMUSCULAR; INTRAVENOUS ONCE
Status: COMPLETED | OUTPATIENT
Start: 2025-04-25 | End: 2025-04-25

## 2025-04-25 RX ORDER — HYDRALAZINE HYDROCHLORIDE 25 MG/1
25 TABLET, FILM COATED ORAL EVERY 12 HOURS
Status: DISCONTINUED | OUTPATIENT
Start: 2025-04-25 | End: 2025-04-26

## 2025-04-25 RX ORDER — MECLIZINE HYDROCHLORIDE 25 MG/1
25 TABLET ORAL ONCE
Status: COMPLETED | OUTPATIENT
Start: 2025-04-25 | End: 2025-04-25

## 2025-04-25 RX ADMIN — ATORVASTATIN CALCIUM 20 MG: 20 TABLET, FILM COATED ORAL at 19:10

## 2025-04-25 RX ADMIN — Medication 2 G: at 20:28

## 2025-04-25 RX ADMIN — METOCLOPRAMIDE 10 MG: 5 INJECTION, SOLUTION INTRAMUSCULAR; INTRAVENOUS at 15:17

## 2025-04-25 RX ADMIN — INSULIN LISPRO 2 UNITS: 100 INJECTION, SOLUTION INTRAVENOUS; SUBCUTANEOUS at 19:26

## 2025-04-25 RX ADMIN — SODIUM CHLORIDE 500 ML: 0.9 INJECTION, SOLUTION INTRAVENOUS at 15:10

## 2025-04-25 RX ADMIN — HEPARIN SODIUM 5000 UNITS: 5000 INJECTION, SOLUTION INTRAVENOUS; SUBCUTANEOUS at 21:41

## 2025-04-25 RX ADMIN — IOHEXOL 85 ML: 350 INJECTION, SOLUTION INTRAVENOUS at 16:46

## 2025-04-25 RX ADMIN — MECLIZINE HYDROCHLORIDE 25 MG: 25 TABLET ORAL at 15:14

## 2025-04-25 RX ADMIN — HYDRALAZINE HYDROCHLORIDE 25 MG: 25 TABLET ORAL at 19:10

## 2025-04-25 NOTE — ASSESSMENT & PLAN NOTE
Creatinine 1.44 currently  Unclear if this is SALVADOR versus patient's underlying CKD  S/p 400 cc fluid bolus in the ED  Holding home diuretic agents/oral hypoglycemic agents  Monitor BMP closely  Nephrology on board.

## 2025-04-25 NOTE — ASSESSMENT & PLAN NOTE
Continue home amlodipine/Toprol-XL/hydralazine  Chlorthalidone held secondary to hyponatremia  Aldactone/Cozaar held secondary to concern for SALVADOR

## 2025-04-25 NOTE — ED PROVIDER NOTES
Time reflects when diagnosis was documented in both MDM as applicable and the Disposition within this note       Time User Action Codes Description Comment    4/25/2025  3:50 PM Kendrick Reddy Add [E87.1] Hyponatremia     4/25/2025  3:50 PM Kendrick Reddy Add [R42] Dizziness           ED Disposition       ED Disposition   Admit    Condition   Stable    Date/Time   Fri Apr 25, 2025  5:49 PM    Comment   Case was discussed with OPHELIA and the patient's admission status was agreed to be Admission Status: inpatient status to the service of Dr. Muñiz .               Assessment & Plan       Medical Decision Making  75-year-old female history of hypertension and diabetes on aspirin presenting with feelings of off balance.  Plan for basic labs.  Symptom management with oral and IV medications plus fluids.  CT imaging.  Reassess.    Labs interpreted by me notable for sodium of 122.  CT imaging without significant acute process.  Some improvement in symptoms with medications and fluids.  Plan for further evaluation management inpatient.  Admitted.    Amount and/or Complexity of Data Reviewed  Labs: ordered.  Radiology: ordered.    Risk  Prescription drug management.  Decision regarding hospitalization.             Medications   sodium chloride 0.9 % bolus 500 mL (0 mL Intravenous Stopped 4/25/25 1643)   metoclopramide (REGLAN) injection 10 mg (10 mg Intravenous Given 4/25/25 1517)   meclizine (ANTIVERT) tablet 25 mg (25 mg Oral Given 4/25/25 1514)   iohexol (OMNIPAQUE) 350 MG/ML injection (MULTI-DOSE) 85 mL (85 mL Intravenous Given 4/25/25 1646)       ED Risk Strat Scores         Stroke Assessment       Row Name 04/25/25 1450             NIH Stroke Scale    Interval Baseline      Level of Consciousness (1a.) 0      LOC Questions (1b.) 0      LOC Commands (1c.) 0      Best Gaze (2.) 0      Visual (3.) 0      Facial Palsy (4.) 0      Motor Arm, Left (5a.) 0      Motor Arm, Right (5b.) 0      Motor Leg, Left (6a.) 0      Motor Leg,  "Right (6b.) 0      Limb Ataxia (7.) 0      Sensory (8.) 0      Best Language (9.) 0      Dysarthria (10.) 0      Extinction and Inattention (11.) (Formerly Neglect) 0      Total 0                    Flowsheet Row Most Recent Value   Thrombolytic Decision Options    Thrombolytic Decision Patient not a candidate.   Patient is not a candidate options Unclear time of onset outside appropriate time window.                    No data recorded        SBIRT 20yo+      Flowsheet Row Most Recent Value   Initial Alcohol Screen: US AUDIT-C     1. How often do you have a drink containing alcohol? 0 Filed at: 04/25/2025 1458   2. How many drinks containing alcohol do you have on a typical day you are drinking?  0 Filed at: 04/25/2025 1458   3b. FEMALE Any Age, or MALE 65+: How often do you have 4 or more drinks on one occassion? 0 Filed at: 04/25/2025 1458   Audit-C Score 0 Filed at: 04/25/2025 1458   FATOUMATA: How many times in the past year have you...    Used an illegal drug or used a prescription medication for non-medical reasons? Never Filed at: 04/25/2025 1458                            History of Present Illness       Chief Complaint   Patient presents with    Dizziness     Patient reports left arm and leg numbness for \"several months\" with new onset dizziness starting this AM. Denies any LOC or falls. (-) thinners. GCS 15, [place] scale(-).        Past Medical History:   Diagnosis Date    Diabetes mellitus (HCC)     Hypertension       History reviewed. No pertinent surgical history.   History reviewed. No pertinent family history.   Social History     Tobacco Use    Smoking status: Former     Types: Cigarettes    Smokeless tobacco: Never   Vaping Use    Vaping status: Never Used   Substance Use Topics    Alcohol use: Never    Drug use: Never      E-Cigarette/Vaping    E-Cigarette Use Never User       E-Cigarette/Vaping Substances      I have reviewed and agree with the history as documented.     75-year-old female history " of hypertension and diabetes on aspirin presenting with feelings of off balance.  Patient reports that she woke up this morning around 9 AM and felt off balance.  Reports symptoms improved with sitting down and lying still.  Worsens with standing up and moving.  Denies any room spinning dizziness.  Denies any recent URI-like symptoms.  Denies any chest pain shortness of breath.  Denies any weakness.  Patient reports baseline paresthesias to left arm and leg and denies any palpable decreased sensation.  Denies any known head strikes or injuries.  Denies any new motor or sensory deficit.  Denies any nausea vomiting diarrhea.  Denies any other complaints.  Chart reviewed.    Past Medical History:  No date: Diabetes mellitus (HCC)  No date: Hypertension  Family History: non-contributory  Social History          Review of Systems   Constitutional:  Negative for appetite change, chills, diaphoresis, fever and unexpected weight change.   HENT:  Negative for congestion and rhinorrhea.    Eyes:  Negative for photophobia and visual disturbance.   Respiratory:  Negative for cough, chest tightness and shortness of breath.    Cardiovascular:  Negative for chest pain, palpitations and leg swelling.   Gastrointestinal:  Negative for abdominal distention, abdominal pain, blood in stool, constipation, diarrhea, nausea and vomiting.   Genitourinary:  Negative for dysuria and hematuria.   Musculoskeletal:  Negative for back pain, joint swelling, neck pain and neck stiffness.   Skin:  Negative for color change, pallor, rash and wound.   Neurological:  Positive for dizziness. Negative for syncope, weakness, light-headedness and headaches.   Psychiatric/Behavioral:  Negative for agitation.    All other systems reviewed and are negative.          Objective       ED Triage Vitals [04/25/25 1446]   Temperature Pulse Blood Pressure Respirations SpO2 Patient Position - Orthostatic VS   98.8 °F (37.1 °C) 60 (!) 192/79 19 100 % Sitting       Temp Source Heart Rate Source BP Location FiO2 (%) Pain Score    Oral Monitor Left arm -- --      Vitals      Date and Time Temp Pulse SpO2 Resp BP Pain Score FACES Pain Rating User   04/25/25 1700 -- 67 98 % 17 163/72 -- -- DS   04/25/25 1446 98.8 °F (37.1 °C) 60 100 % 19 192/79 -- -- PEDRO            Physical Exam  Vitals and nursing note reviewed.   Constitutional:       General: She is not in acute distress.     Appearance: Normal appearance. She is well-developed. She is not ill-appearing, toxic-appearing or diaphoretic.   HENT:      Head: Normocephalic and atraumatic.      Nose: Nose normal. No congestion or rhinorrhea.      Mouth/Throat:      Mouth: Mucous membranes are moist.      Pharynx: Oropharynx is clear. No oropharyngeal exudate or posterior oropharyngeal erythema.   Eyes:      General: No scleral icterus.        Right eye: No discharge.         Left eye: No discharge.      Extraocular Movements: Extraocular movements intact.      Conjunctiva/sclera: Conjunctivae normal.      Pupils: Pupils are equal, round, and reactive to light.   Neck:      Vascular: No JVD.      Trachea: No tracheal deviation.      Comments: Supple. Normal range of motion.   Cardiovascular:      Rate and Rhythm: Normal rate and regular rhythm.      Heart sounds: Normal heart sounds. No murmur heard.     No friction rub. No gallop.      Comments: Normal rate and regular rhythm  Pulmonary:      Effort: Pulmonary effort is normal. No respiratory distress.      Breath sounds: Normal breath sounds. No stridor. No wheezing or rales.      Comments: Clear to auscultation bilaterally  Chest:      Chest wall: No tenderness.   Abdominal:      General: Bowel sounds are normal. There is no distension.      Palpations: Abdomen is soft.      Tenderness: There is no abdominal tenderness. There is no right CVA tenderness, left CVA tenderness, guarding or rebound.      Comments: Soft, nontender, nondistended.  Normal bowel sounds throughout  "  Musculoskeletal:         General: No swelling, tenderness, deformity or signs of injury. Normal range of motion.      Cervical back: Normal range of motion and neck supple. No rigidity. No muscular tenderness.      Right lower leg: No edema.      Left lower leg: No edema.   Lymphadenopathy:      Cervical: No cervical adenopathy.   Skin:     General: Skin is warm and dry.      Coloration: Skin is not pale.      Findings: No erythema or rash.   Neurological:      General: No focal deficit present.      Mental Status: She is alert and oriented to person, place, and time. Mental status is at baseline.      Cranial Nerves: No cranial nerve deficit.      Sensory: No sensory deficit.      Motor: No weakness or abnormal muscle tone.      Coordination: Coordination normal.      Comments: A&Ox3 to person, place, and time. CN 2-12 intact. Strength 5/5 throughout. Sensation intact throughout.  Normal finger-to-nose.   Psychiatric:         Behavior: Behavior normal.         Thought Content: Thought content normal.         Results Reviewed       Procedure Component Value Units Date/Time    Basic metabolic panel [[id number]]     Lab Status: No result Specimen: Blood     Osmolality-\"If this is regarding a toxic alcohol, please STOP and consult medical  for further guidance.\" [[id number]] Collected: 04/25/25 1735    Lab Status: No result Specimen: Blood from Arm, Left     Osmolality, urine [[id number]] Collected: 04/25/25 1735    Lab Status: No result Specimen: Urine, Clean Catch     Creatinine, urine, random [[id number]] Collected: 04/25/25 1735    Lab Status: No result Specimen: Urine, Clean Catch     Sodium, urine, random [[id number]] Collected: 04/25/25 1735    Lab Status: No result Specimen: Urine, Clean Catch     UA w Reflex to Microscopic w Reflex to Culture [[id number]] Collected: 04/25/25 1732    Lab Status: No result Specimen: Urine, Clean Catch     Basic metabolic panel [[id number]]  (Abnormal) Collected: 04/25/25 " 1509    Lab Status: Final result Specimen: Blood from Arm, Right Updated: 04/25/25 1543     Sodium 122 mmol/L      Potassium 4.2 mmol/L      Chloride 89 mmol/L      CO2 24 mmol/L      ANION GAP 9 mmol/L      BUN 17 mg/dL      Creatinine 1.44 mg/dL      Glucose 217 mg/dL      Calcium 9.9 mg/dL      eGFR 35 ml/min/1.73sq m     Narrative:      National Kid[employer] Foundation guidelines for Chronic Kidney Disease (CKD):     Stage 1 with normal or high GFR (GFR > 90 mL/min/1.73 square meters)    Stage 2 Mild CKD (GFR = 60-89 mL/min/1.73 square meters)    Stage 3A Moderate CKD (GFR = 45-59 mL/min/1.73 square meters)    Stage 3B Moderate CKD (GFR = 30-44 mL/min/1.73 square meters)    Stage 4 Severe CKD (GFR = 15-29 mL/min/1.73 square meters)    Stage 5 End Stage CKD (GFR <15 mL/min/1.73 square meters)  Note: GFR calculation is accurate only with a steady state creatinine    CBC and differential [[id number]]  (Abnormal) Collected: 04/25/25 1509    Lab Status: Final result Specimen: Blood from Arm, Right Updated: 04/25/25 1515     WBC 4.60 Thousand/uL      RBC 4.05 Million/uL      Hemoglobin 11.5 g/dL      Hematocrit 34.5 %      MCV 85 fL      MCH 28.4 pg      MCHC 33.3 g/dL      RDW 11.7 %      MPV 9.5 fL      Platelets 278 Thousands/uL      nRBC 0 /100 WBCs      Segmented % 63 %      Immature Grans % 1 %      Lymphocytes % 23 %      Monocytes % 12 %      Eosinophils Relative 1 %      Basophils Relative 0 %      Absolute Neutrophils 2.90 Thousands/µL      Absolute Immature Grans 0.05 Thousand/uL      Absolute Lymphocytes 1.04 Thousands/µL      Absolute Monocytes 0.54 Thousand/µL      Eosinophils Absolute 0.05 Thousand/µL      Basophils Absolute 0.02 Thousands/µL             CTA head and neck with and without contrast   Final Interpretation by Galo Wellington MD (04/25 9206)      CT Brain:  No acute intracranial abnormality. Remote left gangliocapsular type infarct noted.      CT Angiography:   1. No acute process  on CTA neck and brain.   2. Scattered stenoses as described above without high-grade stenosis or vessel occlusion   3. Partially imaged mediastinal and right hilar adenopathy. Recommend dedicated imaging of the chest      The study was marked in EPIC for immediate notification.                  Workstation performed: [id number]             Procedures    ED Medication and Procedure Management   Prior to Admission Medications   Prescriptions Last Dose Informant Patient Reported? Taking?   Diclofenac Sodium (VOLTAREN) 1 %   No No   Sig: Apply 2 g topically 3 (three) times a day   Farxiga 10 MG tablet  Self Yes No   Sig: TAKE 1 TABLET (10 MG) BY MOUTH DAILY.   Tradjenta 5 MG TABS  Self Yes No   Sig: Take 5 mg by mouth daily   amLODIPine (NORVASC) 10 mg tablet  Self Yes No   Sig: Take 10 mg by mouth daily   aspirin 81 mg chewable tablet  Self Yes No   Sig: Chew 81 mg   atorvastatin (LIPITOR) 20 mg tablet  Self Yes No   Sig: TAKE 1 TABLET BY MOUTH AT BEDTIME FOR HIGH AMOUNT OF FATS IN THE BLOOD   chlorthalidone 25 mg tablet  Self Yes No   Sig: Take 25 mg by mouth daily   glucose blood test strip  Self Yes No   Sig: FOR ONCE DAILY BG TESTING, DX E11.9 TYPE 2 DM INDICATIONS: TYPE 2 DM   hydrALAZINE (APRESOLINE) 25 mg tablet  Self Yes No   Sig: TAKE 2 TABLETS BY MOUTH EVERY MORNING AND TAKE 1 TABLET EVERY EVENING   latanoprost (XALATAN) 0.005 % ophthalmic solution  Self Yes No   Sig: instill 1 drop into both eyes at bedtime   levothyroxine 100 mcg tablet  Self Yes No   Sig: Take 100 mcg by mouth daily   losartan (COZAAR) 25 mg tablet  Self Yes No   Sig: Take 25 mg by mouth daily   metFORMIN (GLUCOPHAGE) 1000 MG tablet  Self Yes No   Sig: TAKE 1 TABLET BY MOUTH 2 TIMES A DAY WITH MEALS. INDICATIONS: DIABETES   metoprolol succinate (TOPROL-XL) 25 mg 24 hr tablet  Self Yes No   Sig: TAKE 1 TABLET BY MOUTH EVERY DAY FOR HIGH BLOOD PRESSURE   repaglinide (PRANDIN) 1 mg tablet  Self Yes No   spironolactone (ALDACTONE) 25 mg tablet   Self Yes No      Facility-Administered Medications: None     Patient's Medications   Discharge Prescriptions    No medications on file     No discharge procedures on file.  ED SEPSIS DOCUMENTATION   Time reflects when diagnosis was documented in both MDM as applicable and the Disposition within this note       Time User Action Codes Description Comment    4/25/2025  3:50 PM Kendrick Reddy Add [E87.1] Hyponatremia     4/25/2025  3:50 PM Kendrick Reddy Add [R42] Dizziness                  Kendrick Reddy MD  04/25/25 7315

## 2025-04-25 NOTE — ASSESSMENT & PLAN NOTE
"No results found for: \"HGBA1C\"    No results for input(s): \"POCGLU\" in the last 72 hours.    Blood Sugar Average: Last 72 hrs:    Hold home Farxiga, Glucophage, Tradjenta during hospitalization  Sliding scale coverage ordered with ACHS checks  Consistent carb diet  Hypoglycemia protocol.  "

## 2025-04-25 NOTE — H&P
"H&P - Hospitalist   Name: Xochitl Madrigal 75 y.o. female I MRN: [id number]  Unit/Bed#: ED 22 I Date of Admission: 4/25/2025   Date of Service: 4/25/2025 I Hospital Day: 0     Assessment & Plan  Hyponatremia  Comes in with symptoms of feeling off balance since this morning  Denies any reduced oral intake  No recent vomiting/diarrhea episodes  Denies any increased confusion.    Sodium 122 on admission [on review of prior records sodium 139 in Jan 2025]  Urine sodium/urine osmolality sent  Uric acid level ordered  S/p 500 cc normal saline bolus in ED.    Discussed with nephrology monitor BMP checks every 6 hours.    For now hold off on further fluids until urine studies are back   hold home chlorthalidone.  Urine retention protocol ordered, bladder scan every 8 hours  CT, CTA head and neck negative for any acute process.  Monitor intake/output  Monitor on telemetry  Hypertension  Continue home amlodipine/Toprol-XL/hydralazine  Chlorthalidone held secondary to hyponatremia  Aldactone/Cozaar held secondary to concern for SALVADOR  SALVADOR (acute kidney injury) (HCC)  Creatinine 1.44 currently  Unclear if this is SALVADOR versus patient's underlying CKD  S/p 400 cc fluid bolus in the ED  Holding home diuretic agents/oral hypoglycemic agents  Monitor BMP closely  Nephrology on board.  Type 2 diabetes mellitus (HCC)  No results found for: \"HGBA1C\"    No results for input(s): \"POCGLU\" in the last 72 hours.    Blood Sugar Average: Last 72 hrs:    Hold home Farxiga, Glucophage, Tradjenta during hospitalization  Sliding scale coverage ordered with ACHS checks  Consistent carb diet  Hypoglycemia protocol.      VTE Pharmacologic Prophylaxis: VTE Score: 3 heparin  Code Status: Level 1 - Full Code   Discussion with family: Updated  () at bedside.    Anticipated Length of Stay: Patient will be admitted on an inpatient basis with an anticipated length of stay of greater than 2 midnights secondary to " hyponatremia.    History of Present Illness   Chief Complaint: feeling off balance    Xochitl Madrigal is a 75 y.o. female with a PMH of diabetes, hypertension, severe aortic stenosis who presents with symptoms of feeling dizzy since this morning.  No one-sided weakness/visual changes/slurring in speech.  No recent nausea vomiting diarrhea, viral illnesses.  No fevers.    Review of Systems   Constitutional:  Negative for chills and fever.   HENT:  Negative for ear pain and sore throat.    Eyes:  Negative for pain and visual disturbance.   Respiratory:  Negative for cough and shortness of breath.    Cardiovascular:  Negative for chest pain, palpitations and leg swelling.   Gastrointestinal:  Negative for abdominal pain, diarrhea, nausea and vomiting.   Genitourinary:  Negative for dysuria, flank pain and hematuria.   Musculoskeletal:  Negative for arthralgias and back pain.   Skin:  Negative for color change and rash.   Neurological:  Positive for dizziness and weakness. Negative for seizures and syncope.   All other systems reviewed and are negative.      Historical Information   Past Medical History:   Diagnosis Date    Diabetes mellitus (HCC)     Hypertension      History reviewed. No pertinent surgical history.  Social History     Tobacco Use    Smoking status: Former     Types: Cigarettes    Smokeless tobacco: Never   Vaping Use    Vaping status: Never Used   Substance and Sexual Activity    Alcohol use: Never    Drug use: Never    Sexual activity: Not on file     E-Cigarette/Vaping    E-Cigarette Use Never User      E-Cigarette/Vaping Substances     Family history non-contributory  Social History:  Marital Status: /Civil Union       Meds/Allergies   I have reviewed home medications with patient personally.  Prior to Admission medications    Medication Sig Start Date End Date Taking? Authorizing Provider   amLODIPine (NORVASC) 10 mg tablet Take 10 mg by mouth daily 4/4/22   Historical Provider, MD    aspirin 81 mg chewable tablet Chew 81 mg    Historical Provider, MD   atorvastatin (LIPITOR) 20 mg tablet TAKE 1 TABLET BY MOUTH AT BEDTIME FOR HIGH AMOUNT OF FATS IN THE BLOOD 3/10/22   Historical Provider, MD   chlorthalidone 25 mg tablet Take 25 mg by mouth daily 2/24/22   Historical Provider, MD   Diclofenac Sodium (VOLTAREN) 1 % Apply 2 g topically 3 (three) times a day 3/13/24   Decphilomena Oakes    Farxiga 10 MG tablet TAKE 1 TABLET (10 MG) BY MOUTH DAILY.    Historical Provider, MD   glucose blood test strip FOR ONCE DAILY BG TESTING, DX E11.9 TYPE 2 DM INDICATIONS: TYPE 2 DM 2/19/22   Historical Provider, MD   hydrALAZINE (APRESOLINE) 25 mg tablet TAKE 2 TABLETS BY MOUTH EVERY MORNING AND TAKE 1 TABLET EVERY EVENING    Historical Provider, MD   latanoprost (XALATAN) 0.005 % ophthalmic solution instill 1 drop into both eyes at bedtime    Historical Provider, MD   levothyroxine 100 mcg tablet Take 100 mcg by mouth daily 3/11/22   Historical Provider, MD   losartan (COZAAR) 25 mg tablet Take 25 mg by mouth daily    Historical Provider, MD   metFORMIN (GLUCOPHAGE) 1000 MG tablet TAKE 1 TABLET BY MOUTH 2 TIMES A DAY WITH MEALS. INDICATIONS: DIABETES 3/17/22   Historical Provider, MD   metoprolol succinate (TOPROL-XL) 25 mg 24 hr tablet TAKE 1 TABLET BY MOUTH EVERY DAY FOR HIGH BLOOD PRESSURE    Historical Provider, MD   repaglinide (PRANDIN) 1 mg tablet  2/26/24   Historical Provider, MD   spironolactone (ALDACTONE) 25 mg tablet  4/4/22   Historical Provider, MD   Tradjenta 5 MG TABS Take 5 mg by mouth daily    Historical Provider, MD     No Known Allergies    Objective :  Temp:  [98.8 °F (37.1 °C)] 98.8 °F (37.1 °C)  HR:  [60-67] 67  BP: (163-192)/(72-79) 163/72  Resp:  [17-19] 17  SpO2:  [98 %-100 %] 98 %  O2 Device: None (Room air)    Physical Exam  Vitals and nursing note reviewed.   Constitutional:       General: She is not in acute distress.     Appearance: She is well-developed. She is  "obese. She is not ill-appearing.   HENT:      Head: Normocephalic and atraumatic.      Mouth/Throat:      Mouth: Mucous membranes are dry.      Pharynx: Oropharynx is clear.   Eyes:      Conjunctiva/sclera: Conjunctivae normal.   Cardiovascular:      Rate and Rhythm: Normal rate and regular rhythm.   Pulmonary:      Effort: Pulmonary effort is normal. No respiratory distress.      Breath sounds: Normal breath sounds.   Abdominal:      General: Abdomen is flat. Bowel sounds are normal.      Palpations: Abdomen is soft.      Tenderness: There is no abdominal tenderness.   Musculoskeletal:      Cervical back: Neck supple.   Skin:     General: Skin is warm and dry.      Capillary Refill: Capillary refill takes less than 2 seconds.   Neurological:      General: No focal deficit present.      Mental Status: She is alert and oriented to person, place, and time.   Psychiatric:         Mood and Affect: Mood normal.          Lines/Drains:      Lab Results: I have reviewed the following results:  Results from last 7 days   Lab Units 04/25/25  1509   WBC Thousand/uL 4.60   HEMOGLOBIN g/dL 11.5   HEMATOCRIT % 34.5*   PLATELETS Thousands/uL 278   SEGS PCT % 63   LYMPHO PCT % 23   MONO PCT % 12   EOS PCT % 1     Results from last 7 days   Lab Units 04/25/25  1509   SODIUM mmol/L 122*   POTASSIUM mmol/L 4.2   CHLORIDE mmol/L 89*   CO2 mmol/L 24   BUN mg/dL 17   CREATININE mg/dL 1.44*   ANION GAP mmol/L 9   CALCIUM mg/dL 9.9   GLUCOSE RANDOM mg/dL 217*             No results found for: \"HGBA1C\"        Imaging Results Review: I reviewed radiology reports from this admission including: CT head.      Administrative Statements   I have spent a total time of 75 minutes in caring for this patient on the day of the visit/encounter including Diagnostic results, Risks and benefits of tx options, Counseling / Coordination of care, Documenting in the medical record, Reviewing/placing orders in the medical record (including tests, medications, " and/or procedures), Obtaining or reviewing history  , and Communicating with other healthcare professionals .    ** Please Note: This note has been constructed using a voice recognition system. **

## 2025-04-25 NOTE — ASSESSMENT & PLAN NOTE
Comes in with symptoms of feeling off balance since this morning  Denies any reduced oral intake  No recent vomiting/diarrhea episodes  Denies any increased confusion.    Sodium 122 on admission [on review of prior records sodium 139 in Jan 2025]  Urine sodium/urine osmolality sent  Uric acid level ordered  S/p 500 cc normal saline bolus in ED.    Discussed with nephrology monitor BMP checks every 6 hours.    For now hold off on further fluids until urine studies are back   hold home chlorthalidone.  Urine retention protocol ordered, bladder scan every 8 hours  CT, CTA head and neck negative for any acute process.  Monitor intake/output  Monitor on telemetry

## 2025-04-26 PROBLEM — N18.30 STAGE 3 CHRONIC KIDNEY DISEASE (HCC): Status: ACTIVE | Noted: 2025-04-25

## 2025-04-26 LAB
ANION GAP SERPL CALCULATED.3IONS-SCNC: 7 MMOL/L (ref 4–13)
ANION GAP SERPL CALCULATED.3IONS-SCNC: 7 MMOL/L (ref 4–13)
ANION GAP SERPL CALCULATED.3IONS-SCNC: 8 MMOL/L (ref 4–13)
ANION GAP SERPL CALCULATED.3IONS-SCNC: 8 MMOL/L (ref 4–13)
BUN SERPL-MCNC: 15 MG/DL (ref 5–25)
BUN SERPL-MCNC: 16 MG/DL (ref 5–25)
BUN SERPL-MCNC: 16 MG/DL (ref 5–25)
BUN SERPL-MCNC: 19 MG/DL (ref 5–25)
CALCIUM SERPL-MCNC: 9.3 MG/DL (ref 8.4–10.2)
CALCIUM SERPL-MCNC: 9.4 MG/DL (ref 8.4–10.2)
CALCIUM SERPL-MCNC: 9.5 MG/DL (ref 8.4–10.2)
CALCIUM SERPL-MCNC: 9.8 MG/DL (ref 8.4–10.2)
CHLORIDE SERPL-SCNC: 92 MMOL/L (ref 96–108)
CHLORIDE SERPL-SCNC: 92 MMOL/L (ref 96–108)
CHLORIDE SERPL-SCNC: 96 MMOL/L (ref 96–108)
CHLORIDE SERPL-SCNC: 97 MMOL/L (ref 96–108)
CO2 SERPL-SCNC: 24 MMOL/L (ref 21–32)
CO2 SERPL-SCNC: 25 MMOL/L (ref 21–32)
CO2 SERPL-SCNC: 27 MMOL/L (ref 21–32)
CO2 SERPL-SCNC: 27 MMOL/L (ref 21–32)
CREAT SERPL-MCNC: 1.33 MG/DL (ref 0.6–1.3)
CREAT SERPL-MCNC: 1.36 MG/DL (ref 0.6–1.3)
CREAT SERPL-MCNC: 1.39 MG/DL (ref 0.6–1.3)
CREAT SERPL-MCNC: 1.43 MG/DL (ref 0.6–1.3)
ERYTHROCYTE [DISTWIDTH] IN BLOOD BY AUTOMATED COUNT: 11.8 % (ref 11.6–15.1)
EST. AVERAGE GLUCOSE BLD GHB EST-MCNC: 217 MG/DL
GFR SERPL CREATININE-BSD FRML MDRD: 35 ML/MIN/1.73SQ M
GFR SERPL CREATININE-BSD FRML MDRD: 36 ML/MIN/1.73SQ M
GFR SERPL CREATININE-BSD FRML MDRD: 37 ML/MIN/1.73SQ M
GFR SERPL CREATININE-BSD FRML MDRD: 38 ML/MIN/1.73SQ M
GLUCOSE SERPL-MCNC: 103 MG/DL (ref 65–140)
GLUCOSE SERPL-MCNC: 107 MG/DL (ref 65–140)
GLUCOSE SERPL-MCNC: 114 MG/DL (ref 65–140)
GLUCOSE SERPL-MCNC: 165 MG/DL (ref 65–140)
GLUCOSE SERPL-MCNC: 206 MG/DL (ref 65–140)
GLUCOSE SERPL-MCNC: 215 MG/DL (ref 65–140)
GLUCOSE SERPL-MCNC: 256 MG/DL (ref 65–140)
GLUCOSE SERPL-MCNC: 276 MG/DL (ref 65–140)
HBA1C MFR BLD: 9.2 %
HCT VFR BLD AUTO: 30.1 % (ref 34.8–46.1)
HGB BLD-MCNC: 9.9 G/DL (ref 11.5–15.4)
MCH RBC QN AUTO: 28.3 PG (ref 26.8–34.3)
MCHC RBC AUTO-ENTMCNC: 32.9 G/DL (ref 31.4–37.4)
MCV RBC AUTO: 86 FL (ref 82–98)
OSMOLALITY UR/SERPL-RTO: 281 MMOL/KG (ref 282–298)
OSMOLALITY UR: 259 MMOL/KG (ref 250–900)
PLATELET # BLD AUTO: 280 THOUSANDS/UL (ref 149–390)
PMV BLD AUTO: 9 FL (ref 8.9–12.7)
POTASSIUM SERPL-SCNC: 3.5 MMOL/L (ref 3.5–5.3)
POTASSIUM SERPL-SCNC: 3.6 MMOL/L (ref 3.5–5.3)
POTASSIUM SERPL-SCNC: 3.7 MMOL/L (ref 3.5–5.3)
POTASSIUM SERPL-SCNC: 3.9 MMOL/L (ref 3.5–5.3)
RBC # BLD AUTO: 3.5 MILLION/UL (ref 3.81–5.12)
SODIUM SERPL-SCNC: 127 MMOL/L (ref 135–147)
SODIUM SERPL-SCNC: 127 MMOL/L (ref 135–147)
SODIUM SERPL-SCNC: 128 MMOL/L (ref 135–147)
SODIUM SERPL-SCNC: 128 MMOL/L (ref 135–147)
URATE SERPL-MCNC: 5.5 MG/DL (ref 2–7.5)
WBC # BLD AUTO: 4.33 THOUSAND/UL (ref 4.31–10.16)

## 2025-04-26 PROCEDURE — 80048 BASIC METABOLIC PNL TOTAL CA: CPT | Performed by: FAMILY MEDICINE

## 2025-04-26 PROCEDURE — 99232 SBSQ HOSP IP/OBS MODERATE 35: CPT | Performed by: FAMILY MEDICINE

## 2025-04-26 PROCEDURE — 99223 1ST HOSP IP/OBS HIGH 75: CPT | Performed by: INTERNAL MEDICINE

## 2025-04-26 PROCEDURE — 82948 REAGENT STRIP/BLOOD GLUCOSE: CPT

## 2025-04-26 PROCEDURE — 80048 BASIC METABOLIC PNL TOTAL CA: CPT

## 2025-04-26 PROCEDURE — 85027 COMPLETE CBC AUTOMATED: CPT | Performed by: FAMILY MEDICINE

## 2025-04-26 PROCEDURE — 84550 ASSAY OF BLOOD/URIC ACID: CPT | Performed by: FAMILY MEDICINE

## 2025-04-26 RX ORDER — LOSARTAN POTASSIUM 25 MG/1
25 TABLET ORAL DAILY
Status: DISCONTINUED | OUTPATIENT
Start: 2025-04-26 | End: 2025-04-27 | Stop reason: HOSPADM

## 2025-04-26 RX ORDER — INSULIN LISPRO 100 [IU]/ML
1-6 INJECTION, SOLUTION INTRAVENOUS; SUBCUTANEOUS
Status: DISCONTINUED | OUTPATIENT
Start: 2025-04-26 | End: 2025-04-27 | Stop reason: HOSPADM

## 2025-04-26 RX ORDER — SODIUM CHLORIDE 1 G/1
1 TABLET ORAL
Status: DISCONTINUED | OUTPATIENT
Start: 2025-04-26 | End: 2025-04-27 | Stop reason: HOSPADM

## 2025-04-26 RX ORDER — SODIUM CHLORIDE 1 G/1
1 TABLET ORAL
Status: DISCONTINUED | OUTPATIENT
Start: 2025-04-26 | End: 2025-04-26

## 2025-04-26 RX ORDER — HYDRALAZINE HYDROCHLORIDE 25 MG/1
50 TABLET, FILM COATED ORAL EVERY 12 HOURS
Status: DISCONTINUED | OUTPATIENT
Start: 2025-04-26 | End: 2025-04-27 | Stop reason: HOSPADM

## 2025-04-26 RX ORDER — SODIUM CHLORIDE 1 G/1
2 TABLET ORAL
Status: DISCONTINUED | OUTPATIENT
Start: 2025-04-26 | End: 2025-04-26

## 2025-04-26 RX ADMIN — INSULIN LISPRO 2 UNITS: 100 INJECTION, SOLUTION INTRAVENOUS; SUBCUTANEOUS at 11:31

## 2025-04-26 RX ADMIN — INSULIN LISPRO 1 UNITS: 100 INJECTION, SOLUTION INTRAVENOUS; SUBCUTANEOUS at 16:35

## 2025-04-26 RX ADMIN — HEPARIN SODIUM 5000 UNITS: 5000 INJECTION, SOLUTION INTRAVENOUS; SUBCUTANEOUS at 15:00

## 2025-04-26 RX ADMIN — Medication 1 G: at 17:32

## 2025-04-26 RX ADMIN — ATORVASTATIN CALCIUM 20 MG: 20 TABLET, FILM COATED ORAL at 17:30

## 2025-04-26 RX ADMIN — METOPROLOL SUCCINATE 25 MG: 25 TABLET, EXTENDED RELEASE ORAL at 08:49

## 2025-04-26 RX ADMIN — HEPARIN SODIUM 5000 UNITS: 5000 INJECTION, SOLUTION INTRAVENOUS; SUBCUTANEOUS at 21:13

## 2025-04-26 RX ADMIN — AMLODIPINE BESYLATE 10 MG: 10 TABLET ORAL at 08:49

## 2025-04-26 RX ADMIN — HEPARIN SODIUM 5000 UNITS: 5000 INJECTION, SOLUTION INTRAVENOUS; SUBCUTANEOUS at 06:09

## 2025-04-26 RX ADMIN — HYDRALAZINE HYDROCHLORIDE 50 MG: 25 TABLET ORAL at 17:29

## 2025-04-26 RX ADMIN — LEVOTHYROXINE SODIUM 100 MCG: 0.1 TABLET ORAL at 06:09

## 2025-04-26 RX ADMIN — HYDRALAZINE HYDROCHLORIDE 25 MG: 25 TABLET ORAL at 06:09

## 2025-04-26 RX ADMIN — ASPIRIN 81 MG: 81 TABLET, CHEWABLE ORAL at 08:49

## 2025-04-26 RX ADMIN — LOSARTAN POTASSIUM 25 MG: 25 TABLET, FILM COATED ORAL at 08:49

## 2025-04-26 RX ADMIN — Medication 1 G: at 11:31

## 2025-04-26 RX ADMIN — Medication 1 G: at 08:51

## 2025-04-26 RX ADMIN — INSULIN LISPRO 3 UNITS: 100 INJECTION, SOLUTION INTRAVENOUS; SUBCUTANEOUS at 21:13

## 2025-04-26 RX ADMIN — HYDRALAZINE HYDROCHLORIDE 5 MG: 20 INJECTION INTRAMUSCULAR; INTRAVENOUS at 07:30

## 2025-04-26 NOTE — ASSESSMENT & PLAN NOTE
Creatinine 1.44 currently  Unclear if this is SALVADOR versus patient's underlying CKD  S/p 400 cc fluid bolus in the ED  Holding home diuretic agents/oral hypoglycemic agents  Monitor BMP closely  Nephrology on board.  Stable

## 2025-04-26 NOTE — PROGRESS NOTES
Progress Note - Hospitalist   Name: Xochitl Madrigal 75 y.o. female I MRN: [id number]  Unit/Bed#: ICU 04 I Date of Admission: 4/25/2025   Date of Service: 4/26/2025 I Hospital Day: 1    Assessment & Plan  Hyponatremia  Comes in with symptoms of feeling off balance since this morning  Denies any reduced oral intake  No recent vomiting/diarrhea episodes  Denies any increased confusion.  Sodium is improving likely early SIADH.  Discussed with nephrology.  Continue with the salt tabs.  Continue to hold chlorthalidone.  Patient states she is feeling slightly better.  Hypertension  Continue home amlodipine/Toprol-XL/hydralazine  Chlorthalidone held secondary to hyponatremia  Resume the Cozaar.  Stage 3 chronic kidney disease (HCC)  Creatinine 1.44 currently  Unclear if this is SALVADOR versus patient's underlying CKD  S/p 400 cc fluid bolus in the ED  Holding home diuretic agents/oral hypoglycemic agents  Monitor BMP closely  Nephrology on board.  Stable  Type 2 diabetes mellitus (HCC)  Lab Results   Component Value Date    HGBA1C 9.2 (H) 04/25/2025       Recent Labs     04/25/25  1911 04/26/25  0734 04/26/25  1059   POCGLU 220* 107 215*       Blood Sugar Average: Last 72 hrs:  (P) 180.9892094614555212  Hold home Farxiga, Glucophage, Tradjenta during hospitalization  Sliding scale coverage ordered with ACHS checks  Consistent carb diet  Hypoglycemia protocol.    VTE Pharmacologic Prophylaxis: VTE Score: 3 Moderate Risk (Score 3-4) - Pharmacological DVT Prophylaxis Ordered: heparin.    Mobility:   Basic Mobility Inpatient Raw Score: 24  JH-HLM Goal: 8: Walk 250 feet or more  JH-HLM Achieved: 7: Walk 25 feet or more  JH-HLM Goal NOT achieved. Continue with multidisciplinary rounding and encourage appropriate mobility to improve upon JH-HLM goals.    Patient Centered Rounds: I performed bedside rounds with nursing staff today.   Discussions with Specialists or Other Care Team Provider: Nephrology    Education and Discussions  with Family / Patient:  Patient.     Current Length of Stay: 1 day(s)  Current Patient Status: Inpatient   Certification Statement: The patient will continue to require additional inpatient hospital stay due to needing slow correction of sodium  Discharge Plan: Anticipate discharge in 24-48 hrs to home.    Code Status: Level 1 - Full Code    Subjective   Patient seen and examined.  States she is feeling slightly better today.    Objective :  Temp:  [97.6 °F (36.4 °C)-98.8 °F (37.1 °C)] 97.6 °F (36.4 °C)  HR:  [53-74] 65  BP: (134-200)/(63-81) 167/73  Resp:  [16-20] 16  SpO2:  [95 %-100 %] 99 %  O2 Device: None (Room air)    Body mass index is 32.61 kg/m².     Input and Output Summary (last 24 hours):     Intake/Output Summary (Last 24 hours) at 4/26/2025 1301  Last data filed at 4/26/2025 0601  Gross per 24 hour   Intake 500 ml   Output 700 ml   Net -200 ml       Physical Exam  General Appearance:    Alert, cooperative, no distress, appears stated age                               Lungs:     Clear to auscultation bilaterally, respirations unlabored       Heart:    Regular rate and rhythm, S1 and S2 normal, no murmur, rub    or gallop   Abdomen:     Soft, non-tender, bowel sounds active all four quadrants,     no masses, no organomegaly           Extremities:   Extremities normal, atraumatic, no cyanosis or edema                         Lines/Drains:        Telemetry:  Telemetry Orders (From admission, onward)               24 Hour Telemetry Monitoring  Continuous x 24 Hours (Telem)        Expiring   Question:  Reason for 24 Hour Telemetry  Answer:  Metabolic/electrolyte disturbance with high probability of dysrhythmia. K level <3 or >6 OR KCL infusion >10mEq/hr                     Telemetry Reviewed: Normal Sinus Rhythm  Indication for Continued Telemetry Use: No indication for continued use. Will discontinue.                Lab Results: I have reviewed the following results:   Results from last 7 days   Lab Units  04/26/25  0605 04/25/25 1911 04/25/25  1509   WBC Thousand/uL 4.33  --  4.60   HEMOGLOBIN g/dL 9.9*  --  11.5   HEMATOCRIT % 30.1*  --  34.5*   PLATELETS Thousands/uL 280   < > 278   SEGS PCT %  --   --  63   LYMPHO PCT %  --   --  23   MONO PCT %  --   --  12   EOS PCT %  --   --  1    < > = values in this interval not displayed.     Results from last 7 days   Lab Units 04/26/25  1218   SODIUM mmol/L 127*   POTASSIUM mmol/L 3.6   CHLORIDE mmol/L 92*   CO2 mmol/L 27   BUN mg/dL 15   CREATININE mg/dL 1.33*   ANION GAP mmol/L 8   CALCIUM mg/dL 9.8   GLUCOSE RANDOM mg/dL 206*         Results from last 7 days   Lab Units 04/26/25  1059 04/26/25  0734 04/25/25 1911   POC GLUCOSE mg/dl 215* 107 220*     Results from last 7 days   Lab Units 04/25/25  1911   HEMOGLOBIN A1C % 9.2*           Recent Cultures (last 7 days):         Imaging Results Review: No pertinent imaging studies reviewed.  Other Study Results Review: No additional pertinent studies reviewed.    Last 24 Hours Medication List:     Current Facility-Administered Medications:     acetaminophen (TYLENOL) tablet 650 mg, Q6H PRN    amLODIPine (NORVASC) tablet 10 mg, Daily    aspirin chewable tablet 81 mg, Daily    atorvastatin (LIPITOR) tablet 20 mg, Daily With Dinner    heparin (porcine) subcutaneous injection 5,000 Units, Q8H LAUREEN **AND** [COMPLETED] Platelet count, Once    hydrALAZINE (APRESOLINE) injection 5 mg, Q6H PRN    hydrALAZINE (APRESOLINE) tablet 25 mg, Q12H    insulin lispro (HumALOG/ADMELOG) 100 units/mL subcutaneous injection 1-6 Units, TID AC **AND** Fingerstick Glucose (POCT), TID AC    levothyroxine tablet 100 mcg, Daily    losartan (COZAAR) tablet 25 mg, Daily    metoprolol succinate (TOPROL-XL) 24 hr tablet 25 mg, Daily    sodium chloride tablet 1 g, TID With Meals    Administrative Statements   Today, Patient Was Seen By: Marcelino Reynolds MD  I have spent a total time of 20 minutes in caring for this patient on the day of the  visit/encounter including Diagnostic results, Prognosis, Risks and benefits of tx options, Documenting in the medical record, Reviewing/placing orders in the medical record (including tests, medications, and/or procedures), Obtaining or reviewing history  , and Communicating with other healthcare professionals .    **Please Note: This note may have been constructed using a voice recognition system.**

## 2025-04-26 NOTE — CONSULTS
NEPHROLOGY CONSULTATION NOTE    Patient: Xochitl Madrigal               Sex: female          DOA: 4/25/2025  2:51 PM   YOB: 1950        Age:  75 y.o.        LOS:  LOS: 1 day         REASON FOR THE REFERRAL / CONSULTATION: Hyponatremia    DATE OF CONSULTATION / SERVICE: 4/26/2025    ADMISSION DIAGNOSIS: Hyponatremia       PLAN / RECOMMENDATIONS      Assessment & Plan  Hyponatremia  Presented to our facility with a sodium of 122 and low.  Symptomatic and reports that she was having headaches, dizziness, weakness, and mild nausea.    Workup revealing for urine sodium of 41 and urine osmolality of 259.  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  Chlorthalidone was discontinued and patient was provided with oral salt tablets 2 g yesterday evening.    Sodium level improved this morning to 128 and is on target.  Patient reports symptoms are improving.  Will continue oral salt tablet 1 g 3 times daily and monitor serial sodium levels.  Stage 3 chronic kidney disease (HCC)  After review of the medical record via Care Everywhere, it appears her baseline creatinine levels fluctuate around 1.3-1.6.  She is actively following with Dr. Rowland from [facility] from nephrology, though reports she has not seen him in over a year.    Presented to our facility with a creatinine of 1.44 and within suspected baseline.  Most recent creatinine level this morning remained stable at 1.36.  Recommend continuing to monitor renal function during hospitalization.  Hypertension  Currently maintained on amlodipine 10 mg daily, hydralazine 25 mg twice daily, and metoprolol 25 mg daily.  Chlorthalidone discontinued due to hyponatremia as above.    Blood pressure elevated today.  Will resume patient's home losartan and reduce salt tablets to 1 g 3 times daily as above.  Recommend avoidance of thiazide diuretics.    CHIEF COMPLAINT     Dizziness    HPI     Xochitl Madrigal is a 75 y.o. female  with  "a past medical history significant for chronic kidney disease, aortic stenosis, diabetes, and hypertension who presented to our facility with dizziness. A renal consultation is requested today for assistance in the management of hyponatremia.    Patient endorses that since Thursday she was noted feeling \"off balance\" and having dizziness.  She also endorsed decreased appetite, headaches, and mild nausea for the past several days.  On emergency department arrival she was noted to have a sodium of 122 and low.    She does have underlying chronic kidney disease and has previously followed with a nephrologist at [facility].  She reports that she has not seen her nephrologist in over a year but would like to reestablish care with him.    She reports that she has noticed an improvement in her symptoms this morning and is happy to hear that her sodium levels are getting better.    Reviewed past 24 hour events.    PAST MEDICAL HISTORY     Past Medical History:   Diagnosis Date    Diabetes mellitus (HCC)     Hypertension        PAST SURGICAL HISTORY     History reviewed. No pertinent surgical history.    ALLERGIES     No Known Allergies    SOCIAL HISTORY     Social History     Substance and Sexual Activity   Alcohol Use Never     Social History     Substance and Sexual Activity   Drug Use Never     Social History     Tobacco Use   Smoking Status Former    Types: Cigarettes   Smokeless Tobacco Never       FAMILY HISTORY     History reviewed. No pertinent family history.    CURRENT MEDICATIONS       Current Facility-Administered Medications:     acetaminophen (TYLENOL) tablet 650 mg, 650 mg, Oral, Q6H PRN, Phyllis Muñiz MD    amLODIPine (NORVASC) tablet 10 mg, 10 mg, Oral, Daily, Kayleigh Ramos PA-C, 10 mg at 04/26/25 0849    aspirin chewable tablet 81 mg, 81 mg, Oral, Daily, Phyllis Muñiz MD, 81 mg at 04/26/25 0849    atorvastatin (LIPITOR) tablet 20 mg, 20 mg, Oral, Daily With Dinner, Phyllis Muñiz MD, 20 mg at " 04/25/25 1910    heparin (porcine) subcutaneous injection 5,000 Units, 5,000 Units, Subcutaneous, Q8H LAUREEN, 5,000 Units at 04/26/25 0609 **AND** [COMPLETED] Platelet count, , , Once, Phyllis Muñiz MD    hydrALAZINE (APRESOLINE) injection 5 mg, 5 mg, Intravenous, Q6H PRN, Kayleigh Ramos PA-C, 5 mg at 04/26/25 0730    hydrALAZINE (APRESOLINE) tablet 25 mg, 25 mg, Oral, Q12H, Phyllis Muñiz MD, 25 mg at 04/26/25 0609    insulin lispro (HumALOG/ADMELOG) 100 units/mL subcutaneous injection 1-6 Units, 1-6 Units, Subcutaneous, TID AC, 2 Units at 04/25/25 1926 **AND** Fingerstick Glucose (POCT), , , TID AC, Phyllis Muñiz MD    levothyroxine tablet 100 mcg, 100 mcg, Oral, Daily, Phyllis Muñiz MD, 100 mcg at 04/26/25 0609    losartan (COZAAR) tablet 25 mg, 25 mg, Oral, Daily, JAYLYN Carlos, 25 mg at 04/26/25 0849    metoprolol succinate (TOPROL-XL) 24 hr tablet 25 mg, 25 mg, Oral, Daily, Phyllis Muñiz MD, 25 mg at 04/26/25 0849    sodium chloride tablet 1 g, 1 g, Oral, TID With Meals, JAYLYN Carlos, 1 g at 04/26/25 0851    REVIEW OF SYSTEMS   Review of Systems   Constitutional:  Positive for activity change, appetite change and fatigue. Negative for chills and fever.   HENT:  Negative for ear pain and sore throat.    Eyes:  Negative for pain and visual disturbance.   Respiratory:  Negative for shortness of breath.    Cardiovascular:  Negative for palpitations.   Gastrointestinal:  Negative for abdominal pain and vomiting.   Genitourinary:  Negative for dysuria and hematuria.   Musculoskeletal:  Negative for back pain.   Skin:  Negative for color change and rash.   Neurological:  Positive for weakness. Negative for seizures and syncope.   All other systems reviewed and are negative.      OBJECTIVE     Current Weight: Weight - Scale: 88.9 kg (195 lb 15.8 oz)  Vitals:    04/26/25 0849   BP: 157/72   Pulse:    Resp:    Temp:    SpO2:      Body mass index is 32.61 kg/m².    Intake/Output Summary  (Last 24 hours) at 4/26/2025 1041  Last data filed at 4/26/2025 0601  Gross per 24 hour   Intake 500 ml   Output 700 ml   Net -200 ml       PHYSICAL EXAMINATION     Physical Exam  Vitals reviewed.   Constitutional:       General: She is not in acute distress.  HENT:      Head: Normocephalic.      Mouth/Throat:      Lips: Pink.      Mouth: Mucous membranes are moist.   Eyes:      General: Lids are normal. No scleral icterus.  Cardiovascular:      Rate and Rhythm: Normal rate and regular rhythm.      Heart sounds: S1 normal and S2 normal. Murmur heard.   Pulmonary:      Effort: Pulmonary effort is normal. No accessory muscle usage or respiratory distress.      Breath sounds: Normal breath sounds.   Abdominal:      General: There is no distension.      Tenderness: There is no abdominal tenderness.   Musculoskeletal:      Cervical back: Normal range of motion and neck supple. No tenderness.      Right lower leg: No edema.      Left lower leg: No edema.   Skin:     General: Skin is warm.      Coloration: Skin is not cyanotic or jaundiced.   Neurological:      General: No focal deficit present.      Mental Status: She is alert and oriented to person, place, and time.   Psychiatric:         Attention and Perception: Attention normal.         Speech: Speech normal.         Behavior: Behavior is cooperative.           LAB RESULTS        Results from last 7 days   Lab Units 04/26/25  0605 04/26/25  0013 04/25/25  1911 04/25/25  1509   WBC Thousand/uL 4.33  --   --  4.60   HEMOGLOBIN g/dL 9.9*  --   --  11.5   HEMATOCRIT % 30.1*  --   --  34.5*   PLATELETS Thousands/uL 280  --  313 278   POTASSIUM mmol/L 3.5 3.7 4.0 4.2   CHLORIDE mmol/L 96 97 91* 89*   CO2 mmol/L 25 24 25 24   BUN mg/dL 16 16 16 17   CREATININE mg/dL 1.36* 1.39* 1.43* 1.44*   EGFR ml/min/1.73sq m 37 36 35 35   CALCIUM mg/dL 9.3 9.5 10.0 9.9       Recent Labs     04/26/25  0605   WBC 4.33     Recent Labs     04/26/25  0605   HGB 9.9*       Recent Labs      "04/26/25  0605   HCT 30.1*     Recent Labs     04/26/25  0605        Recent Labs     04/26/25  0605   SODIUM 128*     Recent Labs     04/26/25  0605   K 3.5     Recent Labs     04/26/25  0605   CL 96     Recent Labs     04/26/25  0605   CO2 25     Recent Labs     04/26/25  0605   BUN 16     Recent Labs     04/26/25  0605   CREATININE 1.36*     Recent Labs     04/26/25  0605   EGFR 37     Recent Labs     04/26/25  0605   CALCIUM 9.3     No results for input(s): \"MG\" in the last 72 hours.  No results for input(s): \"PHOS\" in the last 72 hours.  Invalid input(s): \"ALBUMIN\"  No results for input(s): \"PROT\" in the last 72 hours.  No results for input(s): \"GLUCOSE\" in the last 72 hours.    RADIOLOGY RESULTS     MRI abdomen with and without contrast/11/2025:  FINDINGS:   Liver:  Normal size. No segmental atrophy or hypertrophy. Smooth contour. Normal signal intensity. No focal lesion.     Portal and hepatic veins: Patent.     Gallbladder: Normal, without dilatation, wall thickening, calculus, or pericholecystic fluid.     Biliary tree: No intrahepatic ductal dilatation. Common bile duct 2 mm, normal. No intraductal calculus or extrinsic obstruction identified.     Pancreas: The signal and morphology of the pancreatic parenchyma is within normal limits.  The main duct is of normal caliber.  Several tiny cystic lesions are identified, including a multilobulated cyst measuring up to 8 mm in the head (3:16), and a 4 mm tubular cystic focus within the anterior neck (3:13) that communicates with the main pancreatic duct, and a punctate cystic focus at the junction of the body and tail (3:20).  No the enhancing or other solid component is identified.     Spleen: Within normal limits.     Adrenal glands: Within normal limits.     Kidneys: Symmetric enhancement. No hydronephrosis.  Arising from superolateral interpolar cortex of the right kidney is a 0.8 cm rounded lesion demonstrating VIBE signal that is minimally and " "heterogeneously more intense than the surrounding renal parenchyma (9:53), signal on fluid sensitive sequences that is equivalent to the surrounding parenchyma, and a single thin enhancing internal septum (28:59).  This lesion correlates to the questioned lesion on the recent CTA and is new since prior MRI dated 11/10/2017.  Several small cortical simple cysts are noted bilaterally.     Bowel: Normal caliber.     Lymph nodes: No lymphadenopathy.     Vasculature:   - Abdominal aorta: Normal caliber and course.   - Inferior vena cava: Patent.     Peritoneum: No ascites.     IMPRESSION:  1.  8 mm lesion of the superolateral interpolar cortex of the right kidney, demonstrating MRI features consistent with a proteinaceous cyst containing a single thin enhancing internal septum.  This lesion is new since 11/10/2017.  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  A follow-up examination in one year's time is recommended to assess for interval change.   2.  Several subcentimeter cystic lesions of the pancreatic parenchyma as described above, statistically most likely to represent small side branch intraductal papillary mucinous neoplasms.  No suspicious MRI feature is identified at this time.       Thank you for the consultation to participate in patient's care. I have discussed this plan with my attending physician.     JAYLYN Carlos    4/26/2025      Portions of the record may have been created with voice recognition software. Occasional wrong word or \"sound a like\" substitutions may have occurred due to the inherent limitations of voice recognition software. Read the chart carefully and recognize, using context, where substitutions have occurred.     "

## 2025-04-26 NOTE — ASSESSMENT & PLAN NOTE
Lab Results   Component Value Date    HGBA1C 9.2 (H) 04/25/2025       Recent Labs     04/25/25  1911 04/26/25  0734 04/26/25  1059   POCGLU 220* 107 215*       Blood Sugar Average: Last 72 hrs:  (P) [id number]  Hold home Farxiga, Glucophage, Tradjenta during hospitalization  Sliding scale coverage ordered with ACHS checks  Consistent carb diet  Hypoglycemia protocol.

## 2025-04-26 NOTE — ASSESSMENT & PLAN NOTE
Comes in with symptoms of feeling off balance since this morning  Denies any reduced oral intake  No recent vomiting/diarrhea episodes  Denies any increased confusion.  Sodium is improving likely early SIADH.  Discussed with nephrology.  Continue with the salt tabs.  Continue to hold chlorthalidone.  Patient states she is feeling slightly better.

## 2025-04-26 NOTE — ASSESSMENT & PLAN NOTE
Currently maintained on amlodipine 10 mg daily, hydralazine 25 mg twice daily, and metoprolol 25 mg daily.  Chlorthalidone discontinued due to hyponatremia as above.    Blood pressure elevated today.  Will resume patient's home losartan and reduce salt tablets to 1 g 3 times daily as above.  Recommend avoidance of thiazide diuretics.

## 2025-04-26 NOTE — ASSESSMENT & PLAN NOTE
Continue home amlodipine/Toprol-XL/hydralazine  Chlorthalidone held secondary to hyponatremia  Resume the Cozaar.

## 2025-04-26 NOTE — PLAN OF CARE
Problem: INFECTION - ADULT  Goal: Absence or prevention of progression during hospitalization  Description: INTERVENTIONS:- Assess and monitor for signs and symptoms of infection- Monitor lab/diagnostic results- Monitor all insertion sites, i.e. indwelling lines, tubes, and drains- Monitor endotracheal if appropriate and nasal secretions for changes in amount and color- [place] appropriate cooling/warming therapies per order- Administer medications as ordered- Instruct and encourage patient and family to use good hand hygiene technique- Identify and instruct in appropriate isolation precautions for identified infection/condition  Outcome: Progressing  Goal: Absence of fever/infection during neutropenic period  Description: INTERVENTIONS:- Monitor WBC  Outcome: Progressing     Problem: SAFETY ADULT  Goal: Patient will remain free of falls  Description: INTERVENTIONS:- Educate patient/family on patient safety including physical limitations- Instruct patient to call for assistance with activity - Consult OT/PT to assist with strengthening/mobility - Keep Call bell within reach- Keep bed low and locked with side rails adjusted as appropriate- Keep care items and personal belongings within reach- Initiate and maintain comfort rounds- Make Fall Risk Sign visible to staff- Offer Toileting every 2 Hours, in advance of need- Initiate/Maintain bed alarm- Obtain necessary fall risk management equipment: - Apply yellow socks and bracelet for high fall risk patients- Consider moving patient to room near nurses station  Outcome: Progressing  Goal: Maintain or return to baseline ADL function  Description: INTERVENTIONS:-  Assess patient's ability to carry out ADLs; assess patient's baseline for ADL function and identify physical deficits which impact ability to perform ADLs (bathing, care of mouth/teeth, toileting, grooming, dressing, etc.)- Assess/evaluate cause of self-care deficits - Assess range of motion- Assess patient's  mobility; develop plan if impaired- Assess patient's need for assistive devices and provide as appropriate- Encourage maximum independence but intervene and supervise when necessary- Involve family in performance of ADLs- Assess for home care needs following discharge - Consider OT consult to assist with ADL evaluation and planning for discharge- Provide patient education as appropriate  Outcome: Progressing  Goal: Maintains/Returns to pre admission functional level  Description: INTERVENTIONS:- Perform AM-PAC 6 Click Basic Mobility/ Daily Activity assessment daily.- Set and communicate daily mobility goal to care team and patient/family/caregiver. - Collaborate with rehabilitation services on mobility goals if consulted- Dangle patient 3 times a day- Stand patient 3 times a day- Ambulate patient 3 times a day- Out of bed to chair 3 times a day - Out of bed for meals 3 times a day- Out of bed for toileting- Record patient progress and toleration of activity level   Outcome: Progressing     Problem: PAIN - ADULT  Goal: Verbalizes/displays adequate comfort level or baseline comfort level  Description: Interventions:- Encourage patient to monitor pain and request assistance- Assess pain using appropriate pain scale- Administer analgesics based on type and severity of pain and evaluate response- Implement non-pharmacological measures as appropriate and evaluate response- Consider cultural and social influences on pain and pain management- Notify physician/advanced practitioner if interventions unsuccessful or patient reports new pain  Outcome: Progressing

## 2025-04-26 NOTE — ASSESSMENT & PLAN NOTE
Presented to our facility with a sodium of 122 and low.  Symptomatic and reports that she was having headaches, dizziness, weakness, and mild nausea.    Workup revealing for urine sodium of 41 and urine osmolality of 259.  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  Chlorthalidone was discontinued and patient was provided with oral salt tablets 2 g yesterday evening.    Sodium level improved this morning to 128 and is on target.  Patient reports symptoms are improving.  Will continue oral salt tablet 1 g 3 times daily and monitor serial sodium levels.

## 2025-04-26 NOTE — ASSESSMENT & PLAN NOTE
After review of the medical record via Care Everywhere, it appears her baseline creatinine levels fluctuate around 1.3-1.6.  She is actively following with Dr. Rowland from [facility] from nephrology, though reports she has not seen him in over a year.    Presented to our facility with a creatinine of 1.44 and within suspected baseline.  Most recent creatinine level this morning remained stable at 1.36.  Recommend continuing to monitor renal function during hospitalization.

## 2025-04-27 VITALS
HEIGHT: 65 IN | OXYGEN SATURATION: 98 % | HEART RATE: 61 BPM | DIASTOLIC BLOOD PRESSURE: 74 MMHG | RESPIRATION RATE: 15 BRPM | SYSTOLIC BLOOD PRESSURE: 152 MMHG | BODY MASS INDEX: 31.96 KG/M2 | WEIGHT: 191.8 LBS | TEMPERATURE: 97.9 F

## 2025-04-27 LAB
ANION GAP SERPL CALCULATED.3IONS-SCNC: 6 MMOL/L (ref 4–13)
BUN SERPL-MCNC: 20 MG/DL (ref 5–25)
CALCIUM SERPL-MCNC: 9.4 MG/DL (ref 8.4–10.2)
CHLORIDE SERPL-SCNC: 97 MMOL/L (ref 96–108)
CO2 SERPL-SCNC: 27 MMOL/L (ref 21–32)
CREAT SERPL-MCNC: 1.39 MG/DL (ref 0.6–1.3)
ERYTHROCYTE [DISTWIDTH] IN BLOOD BY AUTOMATED COUNT: 11.9 % (ref 11.6–15.1)
GFR SERPL CREATININE-BSD FRML MDRD: 36 ML/MIN/1.73SQ M
GLUCOSE SERPL-MCNC: 149 MG/DL (ref 65–140)
GLUCOSE SERPL-MCNC: 245 MG/DL (ref 65–140)
HCT VFR BLD AUTO: 31.2 % (ref 34.8–46.1)
HGB BLD-MCNC: 10.5 G/DL (ref 11.5–15.4)
MCH RBC QN AUTO: 28.8 PG (ref 26.8–34.3)
MCHC RBC AUTO-ENTMCNC: 33.7 G/DL (ref 31.4–37.4)
MCV RBC AUTO: 86 FL (ref 82–98)
PLATELET # BLD AUTO: 302 THOUSANDS/UL (ref 149–390)
PMV BLD AUTO: 9.4 FL (ref 8.9–12.7)
POTASSIUM SERPL-SCNC: 3.8 MMOL/L (ref 3.5–5.3)
RBC # BLD AUTO: 3.64 MILLION/UL (ref 3.81–5.12)
SODIUM SERPL-SCNC: 130 MMOL/L (ref 135–147)
WBC # BLD AUTO: 5.01 THOUSAND/UL (ref 4.31–10.16)

## 2025-04-27 PROCEDURE — 80048 BASIC METABOLIC PNL TOTAL CA: CPT

## 2025-04-27 PROCEDURE — 85027 COMPLETE CBC AUTOMATED: CPT | Performed by: FAMILY MEDICINE

## 2025-04-27 PROCEDURE — 99232 SBSQ HOSP IP/OBS MODERATE 35: CPT | Performed by: INTERNAL MEDICINE

## 2025-04-27 PROCEDURE — 82948 REAGENT STRIP/BLOOD GLUCOSE: CPT

## 2025-04-27 PROCEDURE — 99239 HOSP IP/OBS DSCHRG MGMT >30: CPT | Performed by: FAMILY MEDICINE

## 2025-04-27 RX ORDER — SODIUM CHLORIDE 1 G/1
1 TABLET ORAL
Qty: 90 TABLET | Refills: 0 | Status: SHIPPED | OUTPATIENT
Start: 2025-04-27

## 2025-04-27 RX ORDER — FUROSEMIDE 20 MG/1
20 TABLET ORAL DAILY
Qty: 30 TABLET | Refills: 0 | Status: SHIPPED | OUTPATIENT
Start: 2025-04-27

## 2025-04-27 RX ORDER — FUROSEMIDE 20 MG/1
20 TABLET ORAL DAILY
Status: DISCONTINUED | OUTPATIENT
Start: 2025-04-27 | End: 2025-04-27 | Stop reason: HOSPADM

## 2025-04-27 RX ADMIN — LEVOTHYROXINE SODIUM 100 MCG: 0.1 TABLET ORAL at 05:40

## 2025-04-27 RX ADMIN — METOPROLOL SUCCINATE 25 MG: 25 TABLET, EXTENDED RELEASE ORAL at 08:11

## 2025-04-27 RX ADMIN — LOSARTAN POTASSIUM 25 MG: 25 TABLET, FILM COATED ORAL at 08:11

## 2025-04-27 RX ADMIN — HYDRALAZINE HYDROCHLORIDE 50 MG: 25 TABLET ORAL at 05:40

## 2025-04-27 RX ADMIN — HEPARIN SODIUM 5000 UNITS: 5000 INJECTION, SOLUTION INTRAVENOUS; SUBCUTANEOUS at 05:40

## 2025-04-27 RX ADMIN — INSULIN LISPRO 3 UNITS: 100 INJECTION, SOLUTION INTRAVENOUS; SUBCUTANEOUS at 11:43

## 2025-04-27 RX ADMIN — Medication 1 G: at 08:11

## 2025-04-27 RX ADMIN — ASPIRIN 81 MG: 81 TABLET, CHEWABLE ORAL at 08:11

## 2025-04-27 RX ADMIN — AMLODIPINE BESYLATE 10 MG: 10 TABLET ORAL at 08:11

## 2025-04-27 RX ADMIN — FUROSEMIDE 20 MG: 20 TABLET ORAL at 11:43

## 2025-04-27 RX ADMIN — Medication 1 G: at 11:43

## 2025-04-27 NOTE — ASSESSMENT & PLAN NOTE
After review of the medical record via Care Everywhere, it appears her baseline creatinine levels fluctuate around 1.3-1.6.  She is actively following with Dr. Rowland from [facility] from nephrology, though reports she has not seen him in over a year.    Creatinine level stable within baseline at 1.39 today.

## 2025-04-27 NOTE — ASSESSMENT & PLAN NOTE
Lab Results   Component Value Date    HGBA1C 9.2 (H) 04/25/2025       Recent Labs     04/26/25  1059 04/26/25  1519 04/26/25 2031 04/27/25  1117   POCGLU 215* 165* 256* 245*       Blood Sugar Average: Last 72 hrs:  (P) [id number]  Hold home Farxiga, Glucophage, Tradjenta during hospitalization  Sliding scale coverage ordered with ACHS checks  Consistent carb diet  Hypoglycemia protocol.  Resume home medications on discharge

## 2025-04-27 NOTE — PROGRESS NOTES
Progress Note - Nephrology   Name: Xochitl Madrigal 75 y.o. female I MRN: [id number]  Unit/Bed#: ICU 04 I Date of Admission: 4/25/2025   Date of Service: 4/27/2025 I Hospital Day: 2     Assessment & Plan  Hyponatremia  Presented to our facility with a sodium of 122 and low.  Symptomatic and reports that she was having headaches, dizziness, weakness, and mild nausea.    Workup revealing for urine sodium of 41 and urine osmolality of 259.  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  Chlorthalidone was discontinued and patient was provided started on oral salt tablet.    Sodium level improved at 130 on labs this morning.  Patient with mild lower extremity edema and hypertension, will also start furosemide 20 mg once daily with salt tablets.    Patient is okay for discharge from a nephrology standpoint.  She actively follows with Dr. Rowland from [facility] nephrology and will want to continue her follow-up care for management of CKD and hyponatremia with him as an outpatient.  Advised the importance of getting a BMP within 3 to 5 days and contacting his office tomorrow to schedule her follow-up appointment within 2 weeks.  Stage 3 chronic kidney disease (HCC)  After review of the medical record via Care Everywhere, it appears her baseline creatinine levels fluctuate around 1.3-1.6.  She is actively following with Dr. Rowland from [facility] from nephrology, though reports she has not seen him in over a year.    Creatinine level stable within baseline at 1.39 today.  Hypertension  Currently maintained on amlodipine 10 mg daily, hydralazine 25 mg twice daily, and metoprolol 25 mg daily.  Chlorthalidone discontinued due to hyponatremia as above.    Hydralazine was increased to 50 mg twice daily and she was also placed on Lasix 20 mg once daily.    I have reviewed the nephrology recommendations including sodium monitoring and adding furosemide, with SLIM, and we are in agreement with renal  plan including the information outlined above. I have discussed the above management plan in detail with the primary service.   Ok for discharge from Nephrology service perspective.  Nephrology service will follow.    Subjective   Brief History of Admission -75-year-old female with a past medical history significant for CKD, aortic stenosis, diabetes, hypertension, and presented to our facility with dizziness and found to have hyponatremia.    Patient was seen at the bedside today, no complaints and reports that she feels well.    Objective :  Temp:  [97.5 °F (36.4 °C)-99.7 °F (37.6 °C)] 97.9 °F (36.6 °C)  HR:  [54-71] 61  BP: (134-175)/(66-87) 152/74  Resp:  [15-18] 15  SpO2:  [96 %-99 %] 98 %  O2 Device: None (Room air)    Current Weight: Weight - Scale: 87 kg (191 lb 12.8 oz)  First Weight: Weight - Scale: 88.9 kg (195 lb 15.8 oz)  I/O         04/25 0701  04/26 0700 04/26 0701  04/27 0700 04/27 0701  04/28 0700    P.O.  900     IV Piggyback 500      Total Intake(mL/kg) 500 (5.6) 900 (10.3)     Urine (mL/kg/hr) 700 2725 (1.3)     Total Output 700 2725     Net -200 -1825            Unmeasured Urine Occurrence 1 x            Physical Exam  Vitals and nursing note reviewed.   Constitutional:       General: She is not in acute distress.     Appearance: She is well-developed.   HENT:      Head: Normocephalic and atraumatic.   Eyes:      Conjunctiva/sclera: Conjunctivae normal.   Cardiovascular:      Rate and Rhythm: Normal rate and regular rhythm.   Pulmonary:      Effort: Pulmonary effort is normal. No respiratory distress.      Breath sounds: Normal breath sounds.   Abdominal:      Palpations: Abdomen is soft.      Tenderness: There is no abdominal tenderness.   Musculoskeletal:         General: No swelling.      Cervical back: Neck supple.      Right lower leg: Edema (trace) present.      Left lower leg: Edema (trace) present.   Skin:     General: Skin is warm and dry.      Capillary Refill: Capillary refill takes  less than 2 seconds.   Neurological:      Mental Status: She is alert.   Psychiatric:         Mood and Affect: Mood normal.         Medications:    Current Facility-Administered Medications:     acetaminophen (TYLENOL) tablet 650 mg, 650 mg, Oral, Q6H PRN, Phyllis Muñiz MD    amLODIPine (NORVASC) tablet 10 mg, 10 mg, Oral, Daily, Kayleigh Ramos PA-C, 10 mg at 04/27/25 0811    aspirin chewable tablet 81 mg, 81 mg, Oral, Daily, Phyllis Muñiz MD, 81 mg at 04/27/25 0811    atorvastatin (LIPITOR) tablet 20 mg, 20 mg, Oral, Daily With Dinner, Phyllis Muñiz MD, 20 mg at 04/26/25 1730    heparin (porcine) subcutaneous injection 5,000 Units, 5,000 Units, Subcutaneous, Q8H LAUREEN, 5,000 Units at 04/27/25 0540 **AND** [COMPLETED] Platelet count, , , Once, Phyllis Muñiz MD    hydrALAZINE (APRESOLINE) injection 5 mg, 5 mg, Intravenous, Q6H PRN, Kayleigh Ramos PA-C, 5 mg at 04/26/25 0730    hydrALAZINE (APRESOLINE) tablet 50 mg, 50 mg, Oral, Q12H, JAYLYN Carlos, 50 mg at 04/27/25 0540    insulin lispro (HumALOG/ADMELOG) 100 units/mL subcutaneous injection 1-6 Units, 1-6 Units, Subcutaneous, TID AC, 1 Units at 04/26/25 1635 **AND** Fingerstick Glucose (POCT), , , TID AC, Phyllis Muñiz MD    insulin lispro (HumALOG/ADMELOG) 100 units/mL subcutaneous injection 1-6 Units, 1-6 Units, Subcutaneous, HS, JAYLYN Yee, 3 Units at 04/26/25 2113    levothyroxine tablet 100 mcg, 100 mcg, Oral, Daily, Phyllis Muñiz MD, 100 mcg at 04/27/25 0540    losartan (COZAAR) tablet 25 mg, 25 mg, Oral, Daily, JAYLYN Carlos, 25 mg at 04/27/25 0811    metoprolol succinate (TOPROL-XL) 24 hr tablet 25 mg, 25 mg, Oral, Daily, Phyllis Muñiz MD, 25 mg at 04/27/25 0811    sodium chloride tablet 1 g, 1 g, Oral, TID With Meals, JAYLYN Carlos, 1 g at 04/27/25 0811      Lab Results: I have reviewed the following results:  Results from last 7 days   Lab Units 04/27/25  0536 04/26/25  0465  "04/26/25  1218 04/26/25  0605 04/26/25  0013 04/25/25  1911 04/25/25  1509   WBC Thousand/uL 5.01  --   --  4.33  --   --  4.60   HEMOGLOBIN g/dL 10.5*  --   --  9.9*  --   --  11.5   HEMATOCRIT % 31.2*  --   --  30.1*  --   --  34.5*   PLATELETS Thousands/uL 302  --   --  280  --  313 278   POTASSIUM mmol/L 3.8 3.9 3.6 3.5 3.7 4.0 4.2   CHLORIDE mmol/L 97 92* 92* 96 97 91* 89*   CO2 mmol/L 27 27 27 25 24 25 24   BUN mg/dL 20 19 15 16 16 16 17   CREATININE mg/dL 1.39* 1.43* 1.33* 1.36* 1.39* 1.43* 1.44*   CALCIUM mg/dL 9.4 9.4 9.8 9.3 9.5 10.0 9.9       Administrative Statements     Portions of the record may have been created with voice recognition software. Occasional wrong word or \"sound a like\" substitutions may have occurred due to the inherent limitations of voice recognition software. Read the chart carefully and recognize, using context, where substitutions have occurred.If you have any questions, please contact the dictating provider.  "

## 2025-04-27 NOTE — ASSESSMENT & PLAN NOTE
Comes in with symptoms of feeling off balance since this morning  Denies any reduced oral intake  No recent vomiting/diarrhea episodes  Denies any increased confusion.  Improved.  Sodium is 130  Continue with salt tablets and Lasix.  Follow-up with family doctor as an outpatient

## 2025-04-27 NOTE — DISCHARGE SUMMARY
Discharge Summary - Hospitalist   Name: Xochitl Madrigal 75 y.o. female I MRN: [id number]  Unit/Bed#: ICU 04 I Date of Admission: 4/25/2025   Date of Service: 4/27/2025 I Hospital Day: 2     Assessment & Plan  Hyponatremia  Comes in with symptoms of feeling off balance since this morning  Denies any reduced oral intake  No recent vomiting/diarrhea episodes  Denies any increased confusion.  Improved.  Sodium is 130  Continue with salt tablets and Lasix.  Follow-up with family doctor as an outpatient  Hypertension  Continue home amlodipine/Toprol-XL/hydralazine  Chlorthalidone held secondary to hyponatremia  Resume the Cozaar.  Stage 3 chronic kidney disease (HCC)  Creatinine 1.44 currently  Unclear if this is SALVADOR versus patient's underlying CKD  Check BMP next week.  Type 2 diabetes mellitus (HCC)  Lab Results   Component Value Date    HGBA1C 9.2 (H) 04/25/2025       Recent Labs     04/26/25  1059 04/26/25  1519 04/26/25  2031 04/27/25  1117   POCGLU 215* 165* 256* 245*       Blood Sugar Average: Last 72 hrs:  (P) [id number]  Hold home Farxiga, Glucophage, Tradjenta during hospitalization  Sliding scale coverage ordered with ACHS checks  Consistent carb diet  Hypoglycemia protocol.  Resume home medications on discharge     Medical Problems       Resolved Problems  Date Reviewed: 4/27/2025   None       Discharging Physician / Practitioner: Marcelino Reynolds MD  PCP: No primary care provider on file.  Admission Date:   Admission Orders (From admission, onward)       Ordered        04/25/25 1749  INPATIENT ADMISSION  Once                          Discharge Date: 04/27/25    Consultations During Hospital Stay:  Nephrology    Procedures Performed:   None    Significant Findings / Test Results:   Sodium on discharge 130    Incidental Findings:   CT scan with some hilar adenopathy however the patient did have a CT scan couple of months ago at an outside facility.  Patient is aware of those findings.      Test  "Results Pending at Discharge (will require follow up):   None     Outpatient Tests Requested:  BMP in 5 days    Complications: None    Reason for Admission: Weakness    Hospital Course:   Xochitl Madrigal is a 75 y.o. female patient who originally presented to the hospital on 4/25/2025 due to weakness.    History presenting illness:Xochitl Madrigal is a 75 y.o. female with a PMH of diabetes, hypertension, severe aortic stenosis who presents with symptoms of feeling dizzy since this morning.  No one-sided weakness/visual changes/slurring in speech.  No recent nausea vomiting diarrhea, viral illnesses.  No fevers.     Hospital course: Patient admitted for above reasons.  The patient was found to be hyponatremic.  Was on chlorthalidone likely the cause.  Was put on salt tablets here and doing okay.  Seen by nephrology.  Okay for discharge.  On salt tablets on discharge as well as spironolactone was discontinued as well as the chlorthalidone and the patient was placed on Lasix.  Was cleared by nephrology for discharge today patient has been ambulating without any difficulty.  She did have an incidental finding for which they recommended a dedicated CT scan of the chest however the patient states she recently had 1 done and is aware of those findings with  adenopathy.  Patient is medically stable for discharge          Please see above list of diagnoses and related plan for additional information.     Condition at Discharge: good    Discharge Day Visit / Exam:   Subjective: Patient seen and examined.  Doing okay  Vitals: Blood Pressure: 152/74 (04/27/25 0700)  Pulse: 61 (04/27/25 0700)  Temperature: 97.9 °F (36.6 °C) (04/27/25 0700)  Temp Source: Oral (04/27/25 0700)  Respirations: 15 (04/26/25 2330)  Height: 5' 5\" (165.1 cm) (04/25/25 1941)  Weight - Scale: 87 kg (191 lb 12.8 oz) (04/27/25 0540)  SpO2: 98 % (04/27/25 0700)  Physical Exam   General Appearance:    Alert, cooperative, no distress, appears stated age "                               Lungs:     Clear to auscultation bilaterally, respirations unlabored       Heart:    Regular rate and rhythm, S1 and S2 normal, no murmur, rub    or gallop   Abdomen:     Soft, non-tender, bowel sounds active all four quadrants,     no masses, no organomegaly           Extremities:   Extremities normal, atraumatic, no cyanosis or edema                         Discussion with Family:  Patient.     Discharge instructions/Information to patient and family:   See after visit summary for information provided to patient and family.      Provisions for Follow-Up Care:  See after visit summary for information related to follow-up care and any pertinent home health orders.      Mobility at time of Discharge:   Basic Mobility Inpatient Raw Score: 24  JH-HLM Goal: 8: Walk 250 feet or more  JH-HLM Achieved: 8: Walk 250 feet ot more  HLM Goal achieved. Continue to encourage appropriate mobility.     Disposition:   Home    Planned Readmission: Not anticipated    Discharge Medications:  See after visit summary for reconciled discharge medications provided to patient and/or family.      Administrative Statements   Discharge Statement:  I have spent a total time of 36 minutes in caring for this patient on the day of the visit/encounter. >30 minutes of time was spent on: Diagnostic results, Prognosis, Risks and benefits of tx options, Instructions for management, Patient and family education, Importance of tx compliance, Risk factor reductions, Impressions, Counseling / Coordination of care, Documenting in the medical record, Reviewing / ordering tests, medicine, procedures  , and Communicating with other healthcare professionals .    **Please Note: This note may have been constructed using a voice recognition system**

## 2025-04-27 NOTE — PLAN OF CARE
Problem: PAIN - ADULT  Goal: Verbalizes/displays adequate comfort level or baseline comfort level  Description: Interventions:- Encourage patient to monitor pain and request assistance- Assess pain using appropriate pain scale- Administer analgesics based on type and severity of pain and evaluate response- Implement non-pharmacological measures as appropriate and evaluate response- Consider cultural and social influences on pain and pain management- Notify physician/advanced practitioner if interventions unsuccessful or patient reports new pain  Outcome: Progressing     Problem: INFECTION - ADULT  Goal: Absence of fever/infection during neutropenic period  Description: INTERVENTIONS:- Monitor WBC  Outcome: Progressing     Problem: SAFETY ADULT  Goal: Maintain or return to baseline ADL function  Description: INTERVENTIONS:-  Assess patient's ability to carry out ADLs; assess patient's baseline for ADL function and identify physical deficits which impact ability to perform ADLs (bathing, care of mouth/teeth, toileting, grooming, dressing, etc.)- Assess/evaluate cause of self-care deficits - Assess range of motion- Assess patient's mobility; develop plan if impaired- Assess patient's need for assistive devices and provide as appropriate- Encourage maximum independence but intervene and supervise when necessary- Involve family in performance of ADLs- Assess for home care needs following discharge - Consider OT consult to assist with ADL evaluation and planning for discharge- Provide patient education as appropriate  Outcome: Progressing     Problem: DISCHARGE PLANNING  Goal: Discharge to home or other facility with appropriate resources  Description: INTERVENTIONS:- Identify barriers to discharge w/patient and caregiver- Arrange for needed discharge resources and transportation as appropriate- Identify discharge learning needs (meds, wound care, etc.)- Arrange for interpretive services to assist at discharge as  needed- Refer to Case Management Department for coordinating discharge planning if the patient needs post-hospital services based on physician/advanced practitioner order or complex needs related to functional status, cognitive ability, or social support system  Outcome: Progressing     Problem: Knowledge Deficit  Goal: Patient/family/caregiver demonstrates understanding of disease process, treatment plan, medications, and discharge instructions  Description: Complete learning assessment and assess knowledge base.Interventions:- Provide teaching at level of understanding- Provide teaching via preferred learning methods  Outcome: Progressing

## 2025-04-27 NOTE — ASSESSMENT & PLAN NOTE
Creatinine 1.44 currently  Unclear if this is SALVADOR versus patient's underlying CKD  Check BMP next week.

## 2025-04-27 NOTE — ASSESSMENT & PLAN NOTE
Presented to our facility with a sodium of 122 and low.  Symptomatic and reports that she was having headaches, dizziness, weakness, and mild nausea.    Workup revealing for urine sodium of 41 and urine osmolality of 259.  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  Chlorthalidone was discontinued and patient was provided started on oral salt tablet.    Sodium level improved at 130 on labs this morning.  Patient with mild lower extremity edema and hypertension, will also start furosemide 20 mg once daily with salt tablets.    Patient is okay for discharge from a nephrology standpoint.  She actively follows with Dr. Rowland from [facility] and will want to continue her follow-up care for management of CKD and hyponatremia with him as an outpatient.  Advised the importance of getting a BMP within 3 to 5 days and contacting his office tomorrow to schedule her follow-up appointment within 2 weeks.

## 2025-04-27 NOTE — ASSESSMENT & PLAN NOTE
Currently maintained on amlodipine 10 mg daily, hydralazine 25 mg twice daily, and metoprolol 25 mg daily.  Chlorthalidone discontinued due to hyponatremia as above.    Hydralazine was increased to 50 mg twice daily and she was also placed on Lasix 20 mg once daily.